# Patient Record
Sex: MALE | Race: WHITE
[De-identification: names, ages, dates, MRNs, and addresses within clinical notes are randomized per-mention and may not be internally consistent; named-entity substitution may affect disease eponyms.]

---

## 2017-01-05 ENCOUNTER — PRIOR ORIGINAL RECORDS (OUTPATIENT)
Dept: OTHER | Age: 47
End: 2017-01-05

## 2017-01-19 ENCOUNTER — PRIOR ORIGINAL RECORDS (OUTPATIENT)
Dept: OTHER | Age: 47
End: 2017-01-19

## 2017-01-20 LAB
BNP: 6.5 PMOL/L
HEMATOCRIT: 39.7 %
HEMOGLOBIN: 13.2 G/DL
PLATELETS: 170 K/UL
RED BLOOD COUNT: 4.5 X 10-6/U
WHITE BLOOD COUNT: 7.2 X 10-3/U

## 2017-02-28 ENCOUNTER — HOSPITAL ENCOUNTER (OUTPATIENT)
Dept: CV DIAGNOSTICS | Facility: HOSPITAL | Age: 47
Discharge: HOME | End: 2017-02-28
Attending: INTERNAL MEDICINE
Payer: COMMERCIAL

## 2017-02-28 DIAGNOSIS — I50.1 LEFT HEART FAILURE (HCC): ICD-10-CM

## 2017-02-28 DIAGNOSIS — I25.10 CAD (CORONARY ARTERY DISEASE): ICD-10-CM

## 2017-02-28 PROCEDURE — 93018 CV STRESS TEST I&R ONLY: CPT | Performed by: INTERNAL MEDICINE

## 2017-02-28 PROCEDURE — 93017 CV STRESS TEST TRACING ONLY: CPT

## 2017-02-28 PROCEDURE — 78452 HT MUSCLE IMAGE SPECT MULT: CPT | Performed by: INTERNAL MEDICINE

## 2017-02-28 PROCEDURE — 78452 HT MUSCLE IMAGE SPECT MULT: CPT

## 2017-03-03 ENCOUNTER — PRIOR ORIGINAL RECORDS (OUTPATIENT)
Dept: OTHER | Age: 47
End: 2017-03-03

## 2017-06-08 ENCOUNTER — PRIOR ORIGINAL RECORDS (OUTPATIENT)
Dept: OTHER | Age: 47
End: 2017-06-08

## 2017-06-20 ENCOUNTER — PRIOR ORIGINAL RECORDS (OUTPATIENT)
Dept: OTHER | Age: 47
End: 2017-06-20

## 2017-06-21 LAB
ALBUMIN: 3.6 G/DL
ALKALINE PHOSPHATATE(ALK PHOS): 108 IU/L
BILIRUBIN TOTAL: 0.7 MG/DL
BNP: <21.4 PMOL/L
BUN: 14 MG/DL
CALCIUM: 8.6 MG/DL
CHLORIDE: 103 MEQ/L
CHOLESTEROL, TOTAL: 109 MG/DL
CREATININE, SERUM: 1.35 MG/DL
GLOBULIN: 4 G/DL
GLUCOSE: 135 MG/DL
HDL CHOLESTEROL: 29 MG/DL
HEMATOCRIT: 39.4 %
HEMOGLOBIN: 13.7 G/DL
LDL CHOLESTEROL: 43 MG/DL
PLATELETS: 179 K/UL
POTASSIUM, SERUM: 3.7 MEQ/L
PROTEIN, TOTAL: 7.6 G/DL
RED BLOOD COUNT: 4.6 X 10-6/U
SGOT (AST): 25 IU/L
SGPT (ALT): 34 IU/L
SODIUM: 140 MEQ/L
THYROID STIMULATING HORMONE: 0.62 MLU/L
TRIGLYCERIDES: 187 MG/DL
WHITE BLOOD COUNT: 6.8 X 10-3/U

## 2017-08-02 ENCOUNTER — PRIOR ORIGINAL RECORDS (OUTPATIENT)
Dept: OTHER | Age: 47
End: 2017-08-02

## 2018-01-04 ENCOUNTER — PRIOR ORIGINAL RECORDS (OUTPATIENT)
Dept: OTHER | Age: 48
End: 2018-01-04

## 2018-01-04 LAB
ALBUMIN: 3.7 G/DL
ALKALINE PHOSPHATATE(ALK PHOS): 104 IU/L
ALT (SGPT): 27 U/L
AST (SGOT): 20 U/L
BILIRUBIN TOTAL: 0.6 MG/DL
BUN: 21 MG/DL
CALCIUM: 8.4 MG/DL
CHLORIDE: 103 MEQ/L
CHOLESTEROL, TOTAL: 114 MG/DL
CREATININE, SERUM: 1.39 MG/DL
GLUCOSE: 142 MG/DL
GLUCOSE: 142 MG/DL
GLYCOHEMOGLOBIN: 0 %
HDL CHOLESTEROL: 29 MG/DL
HEMATOCRIT: 39.7 %
HEMOGLOBIN A1C: 5.8 %
HEMOGLOBIN: 13.3 G/DL
LDH: 0 IU/L
LDL CHOLESTEROL: 50 MG/DL
PLATELETS: 188 K/UL
POTASSIUM, SERUM: 3.9 MEQ/L
PROTEIN, TOTAL: 6.9 G/DL
RED BLOOD COUNT: 4.6 X 10-6/U
SGOT (AST): 20 IU/L
SGPT (ALT): 27 IU/L
SODIUM: 142 MEQ/L
THYROID STIMULATING HORMONE: 0.69 MLU/L
TRIGLYCERIDES: 177 MG/DL
WHITE BLOOD COUNT: 6.5 X 10-3/U

## 2018-01-16 ENCOUNTER — OFFICE VISIT (OUTPATIENT)
Dept: FAMILY MEDICINE CLINIC | Facility: CLINIC | Age: 48
End: 2018-01-16

## 2018-01-16 VITALS
HEIGHT: 67.5 IN | WEIGHT: 206 LBS | SYSTOLIC BLOOD PRESSURE: 134 MMHG | BODY MASS INDEX: 31.96 KG/M2 | TEMPERATURE: 99 F | RESPIRATION RATE: 14 BRPM | DIASTOLIC BLOOD PRESSURE: 80 MMHG | OXYGEN SATURATION: 100 % | HEART RATE: 76 BPM

## 2018-01-16 DIAGNOSIS — L08.9 SKIN INFECTION: Primary | ICD-10-CM

## 2018-01-16 DIAGNOSIS — L30.9 DERMATITIS: ICD-10-CM

## 2018-01-16 PROCEDURE — 99213 OFFICE O/P EST LOW 20 MIN: CPT | Performed by: NURSE PRACTITIONER

## 2018-01-16 RX ORDER — PANTOPRAZOLE SODIUM 40 MG/1
INJECTION, POWDER, FOR SOLUTION INTRAVENOUS
COMMUNITY
Start: 2014-07-08 | End: 2020-10-14

## 2018-01-16 RX ORDER — CEPHALEXIN 500 MG/1
500 CAPSULE ORAL 2 TIMES DAILY
Qty: 20 CAPSULE | Refills: 0 | Status: SHIPPED | OUTPATIENT
Start: 2018-01-16 | End: 2018-04-13 | Stop reason: ALTCHOICE

## 2018-01-16 RX ORDER — CLOPIDOGREL BISULFATE 75 MG/1
TABLET ORAL
COMMUNITY
Start: 2014-07-08

## 2018-01-16 RX ORDER — CARVEDILOL 25 MG/1
25 TABLET ORAL 2 TIMES DAILY
COMMUNITY
Start: 2020-07-30

## 2018-01-16 RX ORDER — ATORVASTATIN CALCIUM 40 MG/1
40 TABLET, FILM COATED ORAL NIGHTLY
COMMUNITY
Start: 2017-12-31

## 2018-01-16 RX ORDER — HYDRALAZINE HYDROCHLORIDE 100 MG/1
100 TABLET, FILM COATED ORAL 3 TIMES DAILY
COMMUNITY
Start: 2018-01-10

## 2018-01-16 RX ORDER — EPLERENONE 25 MG/1
25 TABLET, FILM COATED ORAL 2 TIMES DAILY
COMMUNITY
Start: 2020-01-30 | End: 2021-03-18 | Stop reason: ALTCHOICE

## 2018-01-16 NOTE — PATIENT INSTRUCTIONS
Push fluids, rest.  Antibiotic as prescribed, take with food. Apply steroid cream to area twice a day for 1 week then twice a day as needed. Do not get this cream on face. Keep area clean and dry, do not scratch or pick at area.   Tylenol or ibuprofen ove

## 2018-01-16 NOTE — PROGRESS NOTES
[address]  PROGRESS NOTE  Denwendy Cage is a 56year old male.     Chief Complaint:  Patient presents with:  Rash: on bottom right leg [id number]    HPI:   Patient presents to office visit with complaint of rash on lower right leg x 6 Meds:    Current Outpatient Prescriptions:  Clopidogrel Bisulfate 75 MG Oral Tab take one tablet by mouth daily Disp:  Rfl:    carvedilol (COREG) 25 MG Oral Tab take one tablet by mouth twice daily Disp:  Rfl:    eplerenone 25 MG Oral Tab Take two tablet b to touch, no pus or streaking. HEAD: atraumatic, normocephalic, wearing glasses  NECK: supple  LUNGS: clear to auscultation, no wheezing, rhonchi, crackles. Breathing is nonlabored.   CARDIO: RRR, S1 and S2 heard, without murmur  EXTREMITIES: no cyanosis

## 2018-01-18 LAB — BNP: <21.4 PMOL/L

## 2018-02-14 ENCOUNTER — PRIOR ORIGINAL RECORDS (OUTPATIENT)
Dept: OTHER | Age: 48
End: 2018-02-14

## 2018-02-22 ENCOUNTER — HOSPITAL ENCOUNTER (OUTPATIENT)
Dept: CV DIAGNOSTICS | Facility: HOSPITAL | Age: 48
Discharge: HOME | End: 2018-02-22
Attending: INTERNAL MEDICINE
Payer: COMMERCIAL

## 2018-02-22 DIAGNOSIS — I25.10 CAD (CORONARY ARTERY DISEASE): ICD-10-CM

## 2018-02-22 PROCEDURE — 78452 HT MUSCLE IMAGE SPECT MULT: CPT | Performed by: INTERNAL MEDICINE

## 2018-02-22 PROCEDURE — 93018 CV STRESS TEST I&R ONLY: CPT | Performed by: INTERNAL MEDICINE

## 2018-02-22 PROCEDURE — 93017 CV STRESS TEST TRACING ONLY: CPT | Performed by: INTERNAL MEDICINE

## 2018-03-07 ENCOUNTER — PRIOR ORIGINAL RECORDS (OUTPATIENT)
Dept: OTHER | Age: 48
End: 2018-03-07

## 2018-04-10 NOTE — PAT NURSING NOTE
Message left for Marquita Villatoro with Dr. Cates, regarding abnormal Stress Test done 2-22-18, and status of cardiac clearance for surgery on 4-23-18.

## 2018-04-12 ENCOUNTER — PRIOR ORIGINAL RECORDS (OUTPATIENT)
Dept: OTHER | Age: 48
End: 2018-04-12

## 2018-04-13 ENCOUNTER — PRIOR ORIGINAL RECORDS (OUTPATIENT)
Dept: OTHER | Age: 48
End: 2018-04-13

## 2018-04-18 ENCOUNTER — PRIOR ORIGINAL RECORDS (OUTPATIENT)
Dept: OTHER | Age: 48
End: 2018-04-18

## 2018-04-18 ENCOUNTER — TELEPHONE (OUTPATIENT)
Dept: FAMILY MEDICINE CLINIC | Facility: CLINIC | Age: 48
End: 2018-04-18

## 2018-04-18 NOTE — TELEPHONE ENCOUNTER
has surgery monday 4/23   -   is patient cleared for surgery based off BW results drawn at [facility] during the week of Marchj 20,2018?    please advise

## 2018-04-18 NOTE — TELEPHONE ENCOUNTER
Informed Jenny Barreto that Dr. Ailyn Benitez has not seen pt for  1 1/2 years. Pt will need pre op clearance. Jenny Barreto will inform pt he will need an appt. Pt carmina had blood work, ekg already done. Pt just need pre op clearance.      Pt will call to schedule a

## 2018-04-23 ENCOUNTER — HOSPITAL ENCOUNTER (INPATIENT)
Facility: HOSPITAL | Age: 48
LOS: 1 days | Discharge: HOME HEALTH | DRG: 470 | End: 2018-04-24
Attending: ORTHOPAEDIC SURGERY | Admitting: ORTHOPAEDIC SURGERY
Payer: COMMERCIAL

## 2018-04-23 ENCOUNTER — APPOINTMENT (OUTPATIENT)
Dept: GENERAL RADIOLOGY | Facility: HOSPITAL | Age: 48
DRG: 470 | End: 2018-04-23
Attending: ORTHOPAEDIC SURGERY
Payer: COMMERCIAL

## 2018-04-23 ENCOUNTER — SURGERY (OUTPATIENT)
Age: 48
End: 2018-04-23

## 2018-04-23 ENCOUNTER — ANESTHESIA (OUTPATIENT)
Dept: SURGERY | Facility: HOSPITAL | Age: 48
DRG: 470 | End: 2018-04-23
Payer: COMMERCIAL

## 2018-04-23 ENCOUNTER — ANESTHESIA EVENT (OUTPATIENT)
Dept: SURGERY | Facility: HOSPITAL | Age: 48
DRG: 470 | End: 2018-04-23
Payer: COMMERCIAL

## 2018-04-23 PROBLEM — M25.559 HIP PAIN: Status: ACTIVE | Noted: 2018-04-23

## 2018-04-23 PROCEDURE — 36415 COLL VENOUS BLD VENIPUNCTURE: CPT | Performed by: ORTHOPAEDIC SURGERY

## 2018-04-23 PROCEDURE — 86850 RBC ANTIBODY SCREEN: CPT | Performed by: ORTHOPAEDIC SURGERY

## 2018-04-23 PROCEDURE — 97162 PT EVAL MOD COMPLEX 30 MIN: CPT

## 2018-04-23 PROCEDURE — 86900 BLOOD TYPING SEROLOGIC ABO: CPT | Performed by: ORTHOPAEDIC SURGERY

## 2018-04-23 PROCEDURE — 76000 FLUOROSCOPY <1 HR PHYS/QHP: CPT | Performed by: ORTHOPAEDIC SURGERY

## 2018-04-23 PROCEDURE — 86901 BLOOD TYPING SEROLOGIC RH(D): CPT | Performed by: ORTHOPAEDIC SURGERY

## 2018-04-23 PROCEDURE — 88305 TISSUE EXAM BY PATHOLOGIST: CPT | Performed by: ORTHOPAEDIC SURGERY

## 2018-04-23 PROCEDURE — 73502 X-RAY EXAM HIP UNI 2-3 VIEWS: CPT | Performed by: ORTHOPAEDIC SURGERY

## 2018-04-23 PROCEDURE — 0SRB04Z REPLACEMENT OF LEFT HIP JOINT WITH CERAMIC ON POLYETHYLENE SYNTHETIC SUBSTITUTE, OPEN APPROACH: ICD-10-PCS | Performed by: ORTHOPAEDIC SURGERY

## 2018-04-23 PROCEDURE — 97530 THERAPEUTIC ACTIVITIES: CPT

## 2018-04-23 PROCEDURE — 88311 DECALCIFY TISSUE: CPT | Performed by: ORTHOPAEDIC SURGERY

## 2018-04-23 DEVICE — BIOLOX DELTA CERAMIC FEMORAL HEAD +1.5 36MM DIA 12/14 TAPER
Type: IMPLANTABLE DEVICE | Site: HIP | Status: FUNCTIONAL
Brand: BIOLOX DELTA

## 2018-04-23 DEVICE — TRI-LOCK BPS FEMORAL STEM 12/14 TAPER TRI-LOCK BPS W/GRIPTION SIZE 5 HI 105MM
Type: IMPLANTABLE DEVICE | Site: HIP | Status: FUNCTIONAL
Brand: TRI-LOCK GRIPTION

## 2018-04-23 DEVICE — PINNACLE HIP SOLUTIONS ALTRX POLYETHYLENE ACETABULAR LINER +4 NEUTRAL 36MM ID 54MM OD
Type: IMPLANTABLE DEVICE | Site: HIP | Status: FUNCTIONAL
Brand: PINNACLE ALTRX

## 2018-04-23 DEVICE — PINNACLE GRIPTION ACETABULAR SHELL SECTOR 54MM OD
Type: IMPLANTABLE DEVICE | Site: HIP | Status: FUNCTIONAL
Brand: PINNACLE GRIPTION

## 2018-04-23 RX ORDER — HYDROMORPHONE HYDROCHLORIDE 1 MG/ML
0.4 INJECTION, SOLUTION INTRAMUSCULAR; INTRAVENOUS; SUBCUTANEOUS EVERY 5 MIN PRN
Status: DISCONTINUED | OUTPATIENT
Start: 2018-04-23 | End: 2018-04-23 | Stop reason: HOSPADM

## 2018-04-23 RX ORDER — CARVEDILOL 25 MG/1
25 TABLET ORAL 2 TIMES DAILY
Status: DISCONTINUED | OUTPATIENT
Start: 2018-04-23 | End: 2018-04-24

## 2018-04-23 RX ORDER — BISACODYL 10 MG
10 SUPPOSITORY, RECTAL RECTAL
Status: DISCONTINUED | OUTPATIENT
Start: 2018-04-23 | End: 2018-04-24

## 2018-04-23 RX ORDER — METOCLOPRAMIDE HYDROCHLORIDE 5 MG/ML
10 INJECTION INTRAMUSCULAR; INTRAVENOUS EVERY 6 HOURS PRN
Status: DISCONTINUED | OUTPATIENT
Start: 2018-04-23 | End: 2018-04-24

## 2018-04-23 RX ORDER — DIPHENHYDRAMINE HYDROCHLORIDE 50 MG/ML
12.5 INJECTION INTRAMUSCULAR; INTRAVENOUS EVERY 4 HOURS PRN
Status: DISCONTINUED | OUTPATIENT
Start: 2018-04-23 | End: 2018-04-24

## 2018-04-23 RX ORDER — DIPHENHYDRAMINE HCL 25 MG
25 CAPSULE ORAL EVERY 4 HOURS PRN
Status: DISCONTINUED | OUTPATIENT
Start: 2018-04-23 | End: 2018-04-24

## 2018-04-23 RX ORDER — SENNOSIDES 8.6 MG
17.2 TABLET ORAL NIGHTLY
Status: DISCONTINUED | OUTPATIENT
Start: 2018-04-23 | End: 2018-04-24

## 2018-04-23 RX ORDER — ACETAMINOPHEN 500 MG
1000 TABLET ORAL ONCE
Status: COMPLETED | OUTPATIENT
Start: 2018-04-23 | End: 2018-04-23

## 2018-04-23 RX ORDER — HYDROCODONE BITARTRATE AND ACETAMINOPHEN 7.5; 325 MG/1; MG/1
1 TABLET ORAL EVERY 4 HOURS PRN
Status: DISCONTINUED | OUTPATIENT
Start: 2018-04-23 | End: 2018-04-24

## 2018-04-23 RX ORDER — FAMOTIDINE 20 MG/1
20 TABLET ORAL 2 TIMES DAILY
Status: DISCONTINUED | OUTPATIENT
Start: 2018-04-23 | End: 2018-04-24

## 2018-04-23 RX ORDER — METOPROLOL TARTRATE 5 MG/5ML
2.5 INJECTION INTRAVENOUS ONCE
Status: DISCONTINUED | OUTPATIENT
Start: 2018-04-23 | End: 2018-04-23 | Stop reason: HOSPADM

## 2018-04-23 RX ORDER — SODIUM CHLORIDE 9 MG/ML
INJECTION, SOLUTION INTRAVENOUS CONTINUOUS
Status: DISCONTINUED | OUTPATIENT
Start: 2018-04-23 | End: 2018-04-24

## 2018-04-23 RX ORDER — HYDRALAZINE HYDROCHLORIDE 100 MG/1
100 TABLET, FILM COATED ORAL 3 TIMES DAILY
Status: DISCONTINUED | OUTPATIENT
Start: 2018-04-23 | End: 2018-04-24

## 2018-04-23 RX ORDER — LIDOCAINE HYDROCHLORIDE 10 MG/ML
INJECTION, SOLUTION EPIDURAL; INFILTRATION; INTRACAUDAL; PERINEURAL AS NEEDED
Status: DISCONTINUED | OUTPATIENT
Start: 2018-04-23 | End: 2018-04-23 | Stop reason: SURG

## 2018-04-23 RX ORDER — HYDROCODONE BITARTRATE AND ACETAMINOPHEN 5; 325 MG/1; MG/1
1 TABLET ORAL AS NEEDED
Status: DISCONTINUED | OUTPATIENT
Start: 2018-04-23 | End: 2018-04-23 | Stop reason: HOSPADM

## 2018-04-23 RX ORDER — POLYETHYLENE GLYCOL 3350 17 G/17G
17 POWDER, FOR SOLUTION ORAL DAILY PRN
Status: DISCONTINUED | OUTPATIENT
Start: 2018-04-23 | End: 2018-04-24

## 2018-04-23 RX ORDER — HYDROMORPHONE HYDROCHLORIDE 1 MG/ML
0.2 INJECTION, SOLUTION INTRAMUSCULAR; INTRAVENOUS; SUBCUTANEOUS EVERY 5 MIN PRN
Status: DISCONTINUED | OUTPATIENT
Start: 2018-04-23 | End: 2018-04-23 | Stop reason: HOSPADM

## 2018-04-23 RX ORDER — CLOPIDOGREL BISULFATE 75 MG/1
75 TABLET ORAL
Status: DISCONTINUED | OUTPATIENT
Start: 2018-04-24 | End: 2018-04-24

## 2018-04-23 RX ORDER — CYCLOBENZAPRINE HCL 10 MG
10 TABLET ORAL EVERY 8 HOURS PRN
Status: DISCONTINUED | OUTPATIENT
Start: 2018-04-23 | End: 2018-04-24

## 2018-04-23 RX ORDER — NALOXONE HYDROCHLORIDE 0.4 MG/ML
80 INJECTION, SOLUTION INTRAMUSCULAR; INTRAVENOUS; SUBCUTANEOUS AS NEEDED
Status: DISCONTINUED | OUTPATIENT
Start: 2018-04-23 | End: 2018-04-23 | Stop reason: HOSPADM

## 2018-04-23 RX ORDER — SCOLOPAMINE TRANSDERMAL SYSTEM 1 MG/1
1 PATCH, EXTENDED RELEASE TRANSDERMAL ONCE
Status: DISCONTINUED | OUTPATIENT
Start: 2018-04-23 | End: 2018-04-23 | Stop reason: HOSPADM

## 2018-04-23 RX ORDER — HYDROMORPHONE HYDROCHLORIDE 1 MG/ML
0.6 INJECTION, SOLUTION INTRAMUSCULAR; INTRAVENOUS; SUBCUTANEOUS EVERY 5 MIN PRN
Status: DISCONTINUED | OUTPATIENT
Start: 2018-04-23 | End: 2018-04-23 | Stop reason: HOSPADM

## 2018-04-23 RX ORDER — HYDROMORPHONE HYDROCHLORIDE 1 MG/ML
0.2 INJECTION, SOLUTION INTRAMUSCULAR; INTRAVENOUS; SUBCUTANEOUS EVERY 2 HOUR PRN
Status: DISCONTINUED | OUTPATIENT
Start: 2018-04-23 | End: 2018-04-24

## 2018-04-23 RX ORDER — HYDROMORPHONE HYDROCHLORIDE 1 MG/ML
0.8 INJECTION, SOLUTION INTRAMUSCULAR; INTRAVENOUS; SUBCUTANEOUS EVERY 2 HOUR PRN
Status: DISCONTINUED | OUTPATIENT
Start: 2018-04-23 | End: 2018-04-24

## 2018-04-23 RX ORDER — SODIUM CHLORIDE 0.9 % (FLUSH) 0.9 %
10 SYRINGE (ML) INJECTION AS NEEDED
Status: DISCONTINUED | OUTPATIENT
Start: 2018-04-23 | End: 2018-04-24

## 2018-04-23 RX ORDER — HYDROMORPHONE HYDROCHLORIDE 1 MG/ML
INJECTION, SOLUTION INTRAMUSCULAR; INTRAVENOUS; SUBCUTANEOUS AS NEEDED
Status: DISCONTINUED | OUTPATIENT
Start: 2018-04-23 | End: 2018-04-23 | Stop reason: SURG

## 2018-04-23 RX ORDER — SODIUM PHOSPHATE, DIBASIC AND SODIUM PHOSPHATE, MONOBASIC 7; 19 G/133ML; G/133ML
1 ENEMA RECTAL ONCE AS NEEDED
Status: DISCONTINUED | OUTPATIENT
Start: 2018-04-23 | End: 2018-04-24

## 2018-04-23 RX ORDER — HYDROMORPHONE HYDROCHLORIDE 1 MG/ML
0.4 INJECTION, SOLUTION INTRAMUSCULAR; INTRAVENOUS; SUBCUTANEOUS EVERY 2 HOUR PRN
Status: DISCONTINUED | OUTPATIENT
Start: 2018-04-23 | End: 2018-04-24

## 2018-04-23 RX ORDER — HALOPERIDOL 5 MG/ML
0.25 INJECTION INTRAMUSCULAR ONCE AS NEEDED
Status: DISCONTINUED | OUTPATIENT
Start: 2018-04-23 | End: 2018-04-23 | Stop reason: HOSPADM

## 2018-04-23 RX ORDER — FAMOTIDINE 10 MG/ML
20 INJECTION, SOLUTION INTRAVENOUS 2 TIMES DAILY
Status: DISCONTINUED | OUTPATIENT
Start: 2018-04-23 | End: 2018-04-24

## 2018-04-23 RX ORDER — SODIUM CHLORIDE, SODIUM LACTATE, POTASSIUM CHLORIDE, CALCIUM CHLORIDE 600; 310; 30; 20 MG/100ML; MG/100ML; MG/100ML; MG/100ML
INJECTION, SOLUTION INTRAVENOUS CONTINUOUS PRN
Status: DISCONTINUED | OUTPATIENT
Start: 2018-04-23 | End: 2018-04-23 | Stop reason: SURG

## 2018-04-23 RX ORDER — LISINOPRIL 40 MG/1
40 TABLET ORAL NIGHTLY
Status: DISCONTINUED | OUTPATIENT
Start: 2018-04-23 | End: 2018-04-24

## 2018-04-23 RX ORDER — MIDAZOLAM HYDROCHLORIDE 1 MG/ML
INJECTION INTRAMUSCULAR; INTRAVENOUS AS NEEDED
Status: DISCONTINUED | OUTPATIENT
Start: 2018-04-23 | End: 2018-04-23 | Stop reason: SURG

## 2018-04-23 RX ORDER — ONDANSETRON 2 MG/ML
INJECTION INTRAMUSCULAR; INTRAVENOUS AS NEEDED
Status: DISCONTINUED | OUTPATIENT
Start: 2018-04-23 | End: 2018-04-23 | Stop reason: SURG

## 2018-04-23 RX ORDER — DEXAMETHASONE SODIUM PHOSPHATE 4 MG/ML
VIAL (ML) INJECTION AS NEEDED
Status: DISCONTINUED | OUTPATIENT
Start: 2018-04-23 | End: 2018-04-23 | Stop reason: SURG

## 2018-04-23 RX ORDER — ONDANSETRON 2 MG/ML
4 INJECTION INTRAMUSCULAR; INTRAVENOUS ONCE AS NEEDED
Status: DISCONTINUED | OUTPATIENT
Start: 2018-04-23 | End: 2018-04-23 | Stop reason: HOSPADM

## 2018-04-23 RX ORDER — CEFAZOLIN SODIUM/WATER 2 G/20 ML
2 SYRINGE (ML) INTRAVENOUS EVERY 8 HOURS
Status: COMPLETED | OUTPATIENT
Start: 2018-04-23 | End: 2018-04-24

## 2018-04-23 RX ORDER — HYDROCODONE BITARTRATE AND ACETAMINOPHEN 5; 325 MG/1; MG/1
2 TABLET ORAL AS NEEDED
Status: DISCONTINUED | OUTPATIENT
Start: 2018-04-23 | End: 2018-04-23 | Stop reason: HOSPADM

## 2018-04-23 RX ORDER — BUPIVACAINE HYDROCHLORIDE AND EPINEPHRINE 5; 5 MG/ML; UG/ML
INJECTION, SOLUTION PERINEURAL AS NEEDED
Status: DISCONTINUED | OUTPATIENT
Start: 2018-04-23 | End: 2018-04-23 | Stop reason: HOSPADM

## 2018-04-23 RX ORDER — GABAPENTIN 300 MG/1
600 CAPSULE ORAL ONCE
Status: COMPLETED | OUTPATIENT
Start: 2018-04-23 | End: 2018-04-23

## 2018-04-23 RX ORDER — HYDROCODONE BITARTRATE AND ACETAMINOPHEN 7.5; 325 MG/1; MG/1
2 TABLET ORAL EVERY 4 HOURS PRN
Status: DISCONTINUED | OUTPATIENT
Start: 2018-04-23 | End: 2018-04-24

## 2018-04-23 RX ORDER — ATORVASTATIN CALCIUM 40 MG/1
40 TABLET, FILM COATED ORAL NIGHTLY
Status: DISCONTINUED | OUTPATIENT
Start: 2018-04-23 | End: 2018-04-24

## 2018-04-23 RX ORDER — ACETAMINOPHEN 325 MG/1
650 TABLET ORAL EVERY 4 HOURS PRN
Status: DISCONTINUED | OUTPATIENT
Start: 2018-04-23 | End: 2018-04-24

## 2018-04-23 RX ORDER — TORSEMIDE 5 MG/1
5 TABLET ORAL DAILY
Status: DISCONTINUED | OUTPATIENT
Start: 2018-04-24 | End: 2018-04-24

## 2018-04-23 RX ORDER — ONDANSETRON 2 MG/ML
4 INJECTION INTRAMUSCULAR; INTRAVENOUS EVERY 4 HOURS PRN
Status: DISCONTINUED | OUTPATIENT
Start: 2018-04-23 | End: 2018-04-24

## 2018-04-23 RX ORDER — DIPHENHYDRAMINE HYDROCHLORIDE 50 MG/ML
25 INJECTION INTRAMUSCULAR; INTRAVENOUS ONCE AS NEEDED
Status: ACTIVE | OUTPATIENT
Start: 2018-04-23 | End: 2018-04-23

## 2018-04-23 RX ORDER — SODIUM CHLORIDE, SODIUM LACTATE, POTASSIUM CHLORIDE, CALCIUM CHLORIDE 600; 310; 30; 20 MG/100ML; MG/100ML; MG/100ML; MG/100ML
INJECTION, SOLUTION INTRAVENOUS CONTINUOUS
Status: DISCONTINUED | OUTPATIENT
Start: 2018-04-23 | End: 2018-04-23 | Stop reason: HOSPADM

## 2018-04-23 RX ORDER — DOCUSATE SODIUM 100 MG/1
100 CAPSULE, LIQUID FILLED ORAL 2 TIMES DAILY
Status: DISCONTINUED | OUTPATIENT
Start: 2018-04-23 | End: 2018-04-24

## 2018-04-23 RX ADMIN — SODIUM CHLORIDE, SODIUM LACTATE, POTASSIUM CHLORIDE, CALCIUM CHLORIDE: 600; 310; 30; 20 INJECTION, SOLUTION INTRAVENOUS at 11:58:00

## 2018-04-23 RX ADMIN — HYDROMORPHONE HYDROCHLORIDE 0.4 MG: 1 INJECTION, SOLUTION INTRAMUSCULAR; INTRAVENOUS; SUBCUTANEOUS at 13:08:00

## 2018-04-23 RX ADMIN — ONDANSETRON 4 MG: 2 INJECTION INTRAMUSCULAR; INTRAVENOUS at 13:23:00

## 2018-04-23 RX ADMIN — HYDROMORPHONE HYDROCHLORIDE 0.4 MG: 1 INJECTION, SOLUTION INTRAMUSCULAR; INTRAVENOUS; SUBCUTANEOUS at 12:33:00

## 2018-04-23 RX ADMIN — DEXAMETHASONE SODIUM PHOSPHATE 4 MG: 4 MG/ML VIAL (ML) INJECTION at 12:07:00

## 2018-04-23 RX ADMIN — LIDOCAINE HYDROCHLORIDE 50 MG: 10 INJECTION, SOLUTION EPIDURAL; INFILTRATION; INTRACAUDAL; PERINEURAL at 12:02:00

## 2018-04-23 RX ADMIN — HYDROMORPHONE HYDROCHLORIDE 0.2 MG: 1 INJECTION, SOLUTION INTRAMUSCULAR; INTRAVENOUS; SUBCUTANEOUS at 13:42:00

## 2018-04-23 RX ADMIN — MIDAZOLAM HYDROCHLORIDE 2 MG: 1 INJECTION INTRAMUSCULAR; INTRAVENOUS at 11:58:00

## 2018-04-23 NOTE — PHYSICAL THERAPY NOTE
PHYSICAL THERAPY HIP EVALUATION - INPATIENT     Room Number: 428/428-A  Evaluation Date: 4/23/2018  Type of Evaluation: Initial  Physician Order: PT Eval and Treat    Presenting Problem: L ANNA - anterior  Reason for Therapy: Mobility Dysfunction and Discha • High cholesterol    • History of blood transfusion 2014   • Sleep apnea     diagnosed but no followup   • Visual impairment        Past Surgical History  Past Surgical History:  No date: ANGIOGRAM  No date: ANGIOPLASTY (CORONARY)      Comment: with 6 s difficulty does the patient currently have. ..  -   Turning over in bed (including adjusting bedclothes, sheets and blankets)?: A Little   -   Sitting down on and standing up from a chair with arms (e.g., wheelchair, bedside commode, etc.): A Little   -   M demonstrates all precautions and safety concerns independently   Goal #5   Current Status IN PROGRESS   Goal #6 Patient independently performs home exercise program for ROM/strengthening per the instructions provided in preparation for discharge.    Goal #6

## 2018-04-23 NOTE — CONSULTS
[facility]    Report of Consultation    Martha Tyler Patient Status:  Inpatient    1970 MRN [id number]   Location [facility] 4W/SW/SE Attending Diana Peralta MD   Hosp Day # 0 PCP Pat Kay MD     Date of Admission:   Alcohol use:  No                     Current Medications:    Current Facility-Administered Medications:  lisinopril (PRINIVIL,ZESTRIL) tab 40 mg 40 mg Oral Nightly   [START ON 4/24/2018] torsemide (DEMADEX) tab 5 mg 5 mg Oral Daily   [START ON 4/24/2018] MG per tab 1 tablet 1 tablet Oral Q4H PRN   Or      hydrocodone-acetaminophen (NORCO) 7.5-325 MG per tab 2 tablet 2 tablet Oral Q4H PRN   HYDROmorphone HCl (DILAUDID) 1 MG/ML injection 0.2 mg 0.2 mg Intravenous Q2H PRN   Or      HYDROmorphone HCl (DILAUDID mg Oral Daily   • [START ON 4/24/2018] Clopidogrel Bisulfate  75 mg Oral Daily   • carvedilol  25 mg Oral BID   • atorvastatin  40 mg Oral Nightly   • HydrALAZINE HCl  100 mg Oral TID   • [START ON 4/24/2018] apixaban  2.5 mg Oral BID   • Senna  17.2 mg Or total hip arthroplasty     Dictated by (CST): Raphael Sellers MD on 4/23/2018 at 16:15     Approved by (CST): Raphael Sellers MD on 4/23/2018 at 16:17          Xr Hip W Or Wo Pelvis 2 Or 3 Views, Left (cpt=[id number])    Result Date: 4/23/2018  CONCLUSION:  1

## 2018-04-23 NOTE — PLAN OF CARE
CARDIOVASCULAR - ADULT    • Maintains optimal cardiac output and hemodynamic stability Progressing    • Absence of cardiac arrhythmias or at baseline Progressing        DISCHARGE PLANNING    • Discharge to home or other facility with appropriate resources patient, Plavix to start tomorrow. Patient given Rx for [facility] 10 prior to surgery. Patient given NORCO 7.5 for PRN pain control this afternoon. VSS on RA. Silverlon dressing in place, C/D/I. No drainage.

## 2018-04-23 NOTE — CM/SW NOTE
4/23CM- MD orders received in regards to discharge planning The Patient was seen at bedside. The Patient resides with his father  in [place] in a ran home with 2 steps to enter.  Prior to hospitalization, the Patient was driving,  grocery shopping, errands,

## 2018-04-23 NOTE — ANESTHESIA POSTPROCEDURE EVALUATION
Patient: Eric Watson    Procedure Summary     Date:  04/23/18 Room / Location:  [address] [place] [address] MAIN OR 12 / [address] [place] [address] MAIN OR    Anesthesia Start:  9174 Anesthesia Stop:  3092    Procedure:  HIP TOTAL REPLACEMENT (Left ) Diagnosis:  (left hip osteoarthritis)    Surgeon:

## 2018-04-23 NOTE — ANESTHESIA PREPROCEDURE EVALUATION
Anesthesia PreOp Note    HPI:     Juan M Chu is a 56year old male who presents for preoperative consultation requested by: Dinora Jara MD    Date of Surgery: 4/23/2018    Procedure(s):  HIP TOTAL REPLACEMENT  Indication: left hip osteoarthritis      H 4/15/2018   carvedilol (COREG) 25 MG Oral Tab take one tablet by mouth twice daily Disp:  Rfl:  4/23/2018 at 0710   eplerenone 25 MG Oral Tab Take two tablet by mouth in AM Disp:  Rfl:  4/23/2018 at 0710   Pantoprazole Sodium 40 MG Intravenous Recon Soln t [facility]-ordered outpatient prescriptions on file.       Seasonal                    Family History   Problem Relation Age of Onset   • Heart Disorder Father    • Cancer Father    • Hypertension Father    • Lipids Father    • Diabetes Mother    • Heart Disorder CRNA      I have informed Juan M Chu  of the nature of the anesthetic plan, benefits, risks, major complications, and any alternative forms of anesthetic management. All of the patient's questions were answered to the best of my ability.  The patient d

## 2018-04-23 NOTE — INTERVAL H&P NOTE
Pre-op Diagnosis: left hip osteoarthritis    The above referenced H&P was reviewed by Brian Lagunas MD on 4/23/2018, the patient was examined and no significant changes have occurred in the patient's condition since the H&P was performed.   I discussed with jem

## 2018-04-24 VITALS
OXYGEN SATURATION: 98 % | WEIGHT: 198 LBS | SYSTOLIC BLOOD PRESSURE: 104 MMHG | HEART RATE: 78 BPM | TEMPERATURE: 98 F | BODY MASS INDEX: 29.33 KG/M2 | DIASTOLIC BLOOD PRESSURE: 57 MMHG | RESPIRATION RATE: 16 BRPM | HEIGHT: 69 IN

## 2018-04-24 PROCEDURE — 85027 COMPLETE CBC AUTOMATED: CPT | Performed by: ORTHOPAEDIC SURGERY

## 2018-04-24 PROCEDURE — 97530 THERAPEUTIC ACTIVITIES: CPT

## 2018-04-24 PROCEDURE — 97110 THERAPEUTIC EXERCISES: CPT

## 2018-04-24 PROCEDURE — 80048 BASIC METABOLIC PNL TOTAL CA: CPT | Performed by: ORTHOPAEDIC SURGERY

## 2018-04-24 PROCEDURE — 97165 OT EVAL LOW COMPLEX 30 MIN: CPT

## 2018-04-24 PROCEDURE — 97116 GAIT TRAINING THERAPY: CPT

## 2018-04-24 PROCEDURE — 97535 SELF CARE MNGMENT TRAINING: CPT

## 2018-04-24 NOTE — DISCHARGE SUMMARY
[address] Patient Status:  Inpatient    1970 MRN [id number]   Location [facility] 4W/SW/SE Attending Di Kerr MD   Hosp Day # 1 PCP Liv France MD     Subjective:  Post-Operative Day: 1 Status Post left Total Intravenous Once PRN   0.9%  NaCl infusion  Intravenous Continuous   apixaban (ELIQUIS) tab 2.5 mg 2.5 mg Oral BID   Senna (SENOKOT) tab TABS 17.2 mg 17.2 mg Oral Nightly   docusate sodium (COLACE) cap 100 mg 100 mg Oral BID   PEG 3350 (MIRALAX) powder pac RBC 3.12 (L) 04/24/2018   HGB 9.7 (L) 04/24/2018   HCT 27.6 (L) 04/24/2018    04/24/2018       Assessment/Plan:    Status Post left Total Hip Arthroplasty. Doing well postoperatively.      Discharge 4/24/18 s/p left ANNA, Return to Clinic: two weeks

## 2018-04-24 NOTE — CM/SW NOTE
[facility] is unable to accept the pt. As they cannot staff his area. Referral made to [facility] and notified them of discharge and sent MD orders and face to face. Plan is for discharge home today 4/24 and [facility] AND [place] is aware.       Family [facility] [phone]

## 2018-04-24 NOTE — FACE TO FACE NOTE
[facility]  Face to Face Encounter Note    Erica Schrader Patient Status:  Inpatient    1970 MRN [id number]   Location [facility] 4W/SW/SE Attending David Menezes MD   [facility] Day # 1 PCP Joanne Reyes MD       I, or a non-physician practi taxing effort for patient.     Certification of home health services  Based on the above findings, I certify that this patient is confined to the home and needs intermittent skilled nursing care, physical therapy and/or speech therapy or continues to need o

## 2018-04-24 NOTE — PHYSICAL THERAPY NOTE
PHYSICAL THERAPY TREATMENT NOTE - INPATIENT     Room Number: 428/428-A       Presenting Problem: L ANNA - anterior    Problem List  Active Problems:    Hip pain      ASSESSMENT   Pt seen BID. Hip precautions reviewed;pt recall 2/3 hip precautions. Hip precaut Modifier (G-Code): CK    FUNCTIONAL ABILITY STATUS  Gait Assessment   Gait Assistance: Minimum assistance  Distance (ft): 2 x 100  Assistive Device: Rolling walker     Stoop/Curb Assistance: Minimum assistance  Comment : 12    Additional information:     T

## 2018-04-24 NOTE — CM/SW NOTE
4/24CM-This Writer made a referral to [facility] ([phone] fax [phone]) and sent out referral documents. MD [facility] with [facility] disciplines orders and a completed face to face form are needed prior to discharge.      -[facility] [id number]

## 2018-04-24 NOTE — OCCUPATIONAL THERAPY NOTE
OCCUPATIONAL THERAPY EVALUATION - INPATIENT      Room Number: 428/428-A  Evaluation Date: 4/24/2018  Type of Evaluation: Initial  Presenting Problem:  (LEFT ANNA)    Physician Order: IP Consult to Occupational Therapy  Reason for Therapy: ADL/IADL Dysfuncti ORCHIECTOMY SIMPLE POSSIBLE RADICAL               ADULT;  Surgeon: Luisa Miranda DO;                 Location: [facility] MAIN OR  7/10/2014: ORCHIECTOMY        Comment: Procedure: ORCHIECTOMY SIMPLE POSSIBLE RADICAL               ADULT;  Surgeon: Hernan Valencia, bedpan or urinal? : None  -   Putting on and taking off regular upper body clothing?: None  -   Taking care of personal grooming such as brushing teeth?: None  -   Eating meals?: None    AM-PAC Score:  Score: 23  Approx Degree of Impairment: 15.86%  Nia Chandra

## 2018-04-24 NOTE — PROGRESS NOTES
[facility]    Progress Note    Roberto Madera Patient Status:  Inpatient    1970 MRN [id number]   Location [facility] 4W/SW/SE Attending Zeenat Rahman MD   Hosp Day # 1 PCP Solomon Roldan MD       Subjective:   Roberto Madera i 140 04/24/2018   CREATSERUM 1.22 04/24/2018   BUN 17 04/24/2018    04/24/2018   K 3.6 04/24/2018    04/24/2018   CO2 23 04/24/2018    (H) 04/24/2018   CA 7.6 (L) 04/24/2018   ALB 1.5 (L) 07/11/2014   ALKPHO 95 07/11/2014   BILT 0.7 07/11

## 2018-04-24 NOTE — PROGRESS NOTES
[facility] Dosing Service  Antibiotic Dosing    Jane Garber is a 56year old male for whom pharmacy is dosing Vancomycin for surgical prophylaxis ordered by Devon Yanes.    Allergies: is allergic to seasonal.    Vitals: BP 96/59 (BP Location: Right

## 2018-04-24 NOTE — PROGRESS NOTES
[address] Patient Status:  Inpatient    1970 MRN [id number]   Location [facility] 4W/SW/SE Attending Amy Kent MD   Hosp Day # 1 PCP Estefania Harris MD     Subjective:  Post-Operative Day: 1 Status Post left Total Intravenous Once PRN   0.9%  NaCl infusion  Intravenous Continuous   apixaban (ELIQUIS) tab 2.5 mg 2.5 mg Oral BID   Senna (SENOKOT) tab TABS 17.2 mg 17.2 mg Oral Nightly   docusate sodium (COLACE) cap 100 mg 100 mg Oral BID   PEG 3350 (MIRALAX) powder pac RBC 3.12 (L) 04/24/2018   HGB 9.7 (L) 04/24/2018   HCT 27.6 (L) 04/24/2018    04/24/2018       Assessment/Plan:    Status Post left Total Hip Arthroplasty. Doing well postoperatively.      Discharge 4/24/18 s/p left ANNA, Return to Clinic: two weeks

## 2018-04-24 NOTE — PROGRESS NOTES
Cardiology progress note    4/24/2018    24 h events: BP lower side of normal         Current Medications:    Current Facility-Administered Medications:  Vancomycin HCl (VANCOCIN) 1,250 mg in sodium chloride 0.9 % 500 mL IVPB 15 mg/kg (Dosing Weight) Intra hydrocodone-acetaminophen (NORCO) 7.5-325 MG per tab 2 tablet 2 tablet Oral Q4H PRN   HYDROmorphone HCl (DILAUDID) 1 MG/ML injection 0.2 mg 0.2 mg Intravenous Q2H PRN   Or      HYDROmorphone HCl (DILAUDID) 1 MG/ML injection 0.4 mg 0.4 mg Intravenous Q2H bowel sounds normal; no masses,  no organomegaly  Extremities: extremities normal, atraumatic, no cyanosis or edema    Results:     Laboratory Data:    Lab Results  Component Value Date   WBC 10.3 04/24/2018   HGB 9.7 (L) 04/24/2018   HCT 27.6 (L) 04/24/20 ventricular systolic function. 3.  Normal electrocardiographic response regadenoson infusion.      Impression/ Recommendations:    1 CAD stable post multiple stents on Plavix, statin, BB, ACIE  Continue all at same dose    2 congestive heart failure HFrEF

## 2018-04-24 NOTE — H&P
[facility]    PATIENT'S NAME: Jorge Maldonado   ATTENDING PHYSICIAN: Steve Mccracken.  Suzette Leal MD   PATIENT ACCOUNT#:   [id number]    LOCATION:  [address] [place] [address] #:   [id number]       YOB: 1970  ADMISSION DATE:       04/23/2018 pressure 132/81, pulse 91, respirations 20, temperature 98.8 degrees Fahrenheit. HEENT:  Pupils equally reactive, round to light. Extraocular movements were intact. Mucosa was moist.  NECK:  No masses. LUNGS:  Clear to auscultation and percussion.   HEA

## 2018-04-25 ENCOUNTER — TELEPHONE (OUTPATIENT)
Dept: FAMILY MEDICINE CLINIC | Facility: CLINIC | Age: 48
End: 2018-04-25

## 2018-04-25 NOTE — TELEPHONE ENCOUNTER
Informed [facility] that we have never seen this pt. He saw Bob Bonilla in January for a skin infection. Dr. Keyonna Parada did not see pt for a pre op.

## 2018-04-25 NOTE — CM/SW NOTE
4/25CM-Family [facility] is not able to accept the Patient. This Writer made referral to the below. Angelique Schwartz ([phone] was able to accept the Patient and will follow up with for start of services.        [facility] AT [facility] ([phone])   [facility]

## 2018-07-02 NOTE — OPERATIVE REPORT
Date of Surgery:  04/23/2018  Surgeon: Marcus Garibay MD  Anesthesia Type:  General  Pre-Op Diagnosis:     (1) AVNof left hip  Post-Op Diagnosis:     (1) AVNof left hip     Date of Surgery:  04/23/2018  Procedure Performed  left ANNA  Indications  AVN of the le hip as well as superior osteophyte formation. We made our femoral neck cut according to our preoperative template. We identified the acetabulum. We placed The retractors carefully and started reaming.   We reamed up in 1 mm increments up to a 53 and inse POSTOPERATIVE PLAN:  Weightbearing as tolerated. Deep venous thrombosis prophylaxis. Mobilization. Perioperative antibiotics. IMPLANTS: DePuy Trilock stem size 5 high offset with a 54 cup, 54 x 36 neutral liner, and a 36, +1.5 Delta ceramic head. Transposition Flap Text: The defect edges were debeveled with a #15 scalpel blade.  Given the location of the defect and the proximity to free margins a transposition flap was deemed most appropriate.  Using a sterile surgical marker, an appropriate transposition flap was drawn incorporating the defect.    The area thus outlined was incised deep to adipose tissue with a #15 scalpel blade.  The skin margins were undermined to an appropriate distance in all directions utilizing iris scissors.

## 2018-07-31 ENCOUNTER — PRIOR ORIGINAL RECORDS (OUTPATIENT)
Dept: OTHER | Age: 48
End: 2018-07-31

## 2018-08-29 LAB
ALBUMIN: 3.5 G/DL
ALKALINE PHOSPHATATE(ALK PHOS): 128 IU/L
BILIRUBIN TOTAL: 0.5 MG/DL
BNP: <21.4 PMOL/L
BUN: 15 MG/DL
CALCIUM: 8.5 MG/DL
CHLORIDE: 104 MEQ/L
CHOLESTEROL, TOTAL: 90 MG/DL
CREATININE, SERUM: 1.21 MG/DL
GLOBULIN: 3.4 G/DL
GLUCOSE: 119 MG/DL
HDL CHOLESTEROL: 31 MG/DL
HEMATOCRIT: 40.1 %
HEMOGLOBIN A1C: 5.8 %
HEMOGLOBIN: 13.3 G/DL
LDL CHOLESTEROL: 38 MG/DL
PLATELETS: 169 K/UL
POTASSIUM, SERUM: 3.6 MEQ/L
PROTEIN, TOTAL: 6.9 G/DL
RED BLOOD COUNT: 4.6 X 10-6/U
SGOT (AST): 17 IU/L
SGPT (ALT): 26 IU/L
SODIUM: 141 MEQ/L
THYROID STIMULATING HORMONE: 0.6 MLU/L
TRIGLYCERIDES: 107 MG/DL
WHITE BLOOD COUNT: 6.9 X 10-3/U

## 2018-09-05 ENCOUNTER — MYAURORA ACCOUNT LINK (OUTPATIENT)
Dept: OTHER | Age: 48
End: 2018-09-05

## 2018-09-05 ENCOUNTER — PRIOR ORIGINAL RECORDS (OUTPATIENT)
Dept: OTHER | Age: 48
End: 2018-09-05

## 2018-09-24 ENCOUNTER — OFFICE VISIT (OUTPATIENT)
Dept: FAMILY MEDICINE CLINIC | Facility: CLINIC | Age: 48
End: 2018-09-24
Payer: COMMERCIAL

## 2018-09-24 VITALS
HEIGHT: 67.5 IN | SYSTOLIC BLOOD PRESSURE: 124 MMHG | HEART RATE: 72 BPM | BODY MASS INDEX: 32.52 KG/M2 | WEIGHT: 209.63 LBS | TEMPERATURE: 98 F | RESPIRATION RATE: 18 BRPM | DIASTOLIC BLOOD PRESSURE: 74 MMHG

## 2018-09-24 DIAGNOSIS — I25.10 CORONARY ARTERY DISEASE INVOLVING NATIVE HEART WITHOUT ANGINA PECTORIS, UNSPECIFIED VESSEL OR LESION TYPE: ICD-10-CM

## 2018-09-24 DIAGNOSIS — L91.8 SKIN TAG: Primary | ICD-10-CM

## 2018-09-24 PROCEDURE — 99213 OFFICE O/P EST LOW 20 MIN: CPT | Performed by: FAMILY MEDICINE

## 2018-09-24 NOTE — PROGRESS NOTES
[facility] [place]  PROGRESS NOTE  Chief Complaint:   Patient presents with:  Skin: skin tag on L eye lid      HPI:   This is a 53year old male coming in for skin tag noted just to the left of his left eye which is been irritating for him as [facility] Hospital          Received:            04/24/2018 06:55 AM                                 Operating Room                                                               Pathologist:           Aure Sow MD Oscar Valdez MD at [address] [facility] ENDOSCOPY  4/23/2018: HIP TOTAL REPLACEMENT;  Left      Comment:  Performed by Basilia Harada, MD at [facility] OR  7/8/2014: ORCHIECTOMY        Comment:  Procedure: ORCHIECTOMY SIMPLE POSSIBLE RADICAL ADULT;                 Candy Mercado topically 2 (two) times daily. AAA sparingly twice a day as needed for 1 week, then twice a day a needed for itching Disp: 30 g Rfl: 0   lisinopril (PRINIVIL,ZESTRIL) 40 MG Oral Tab Take 40 mg by mouth nightly.  Disp:  Rfl:    torsemide (DEMADEX) 10 MG Oral 09/01/2018    Patient/Caregiver Education: Patient/Caregiver Education: There are no barriers to learning. Medical education done. Outcome: Patient verbalizes understanding.  Patient is notified to call with any questions, complications, allergies, or wor

## 2018-11-14 ENCOUNTER — PRIOR ORIGINAL RECORDS (OUTPATIENT)
Dept: OTHER | Age: 48
End: 2018-11-14

## 2018-11-15 ENCOUNTER — PRIOR ORIGINAL RECORDS (OUTPATIENT)
Dept: OTHER | Age: 48
End: 2018-11-15

## 2018-11-16 ENCOUNTER — PRIOR ORIGINAL RECORDS (OUTPATIENT)
Dept: OTHER | Age: 48
End: 2018-11-16

## 2018-11-19 ENCOUNTER — PRIOR ORIGINAL RECORDS (OUTPATIENT)
Dept: OTHER | Age: 48
End: 2018-11-19

## 2018-11-20 ENCOUNTER — OFFICE VISIT (OUTPATIENT)
Dept: FAMILY MEDICINE CLINIC | Facility: CLINIC | Age: 48
End: 2018-11-20
Payer: COMMERCIAL

## 2018-11-20 ENCOUNTER — PRIOR ORIGINAL RECORDS (OUTPATIENT)
Dept: OTHER | Age: 48
End: 2018-11-20

## 2018-11-20 VITALS
HEIGHT: 69 IN | DIASTOLIC BLOOD PRESSURE: 70 MMHG | SYSTOLIC BLOOD PRESSURE: 106 MMHG | TEMPERATURE: 98 F | RESPIRATION RATE: 18 BRPM | WEIGHT: 209 LBS | BODY MASS INDEX: 30.96 KG/M2 | HEART RATE: 52 BPM

## 2018-11-20 DIAGNOSIS — I25.10 CORONARY ARTERY DISEASE INVOLVING NATIVE HEART WITHOUT ANGINA PECTORIS, UNSPECIFIED VESSEL OR LESION TYPE: ICD-10-CM

## 2018-11-20 DIAGNOSIS — R94.31 ABNORMAL ELECTROCARDIOGRAM: ICD-10-CM

## 2018-11-20 DIAGNOSIS — G47.33 OSA (OBSTRUCTIVE SLEEP APNEA): Primary | ICD-10-CM

## 2018-11-20 PROCEDURE — 99244 OFF/OP CNSLTJ NEW/EST MOD 40: CPT | Performed by: FAMILY MEDICINE

## 2018-11-20 NOTE — PATIENT INSTRUCTIONS
If you have not heard from [facility] sleep [place] within the next 1 week please call the Sleep Lab at  [phone] or our office to help with scheduling. You should have heard from our precert department within the next 3-5 days.   IF you have not hea

## 2018-11-20 NOTE — PROGRESS NOTES
[facility]        HPI:   This is a 53year old male coming in for Patient presents with:  Consult: SUHA      HPI:   Pt is here to discuss his sleep. He states he sees doctor Christy Quiroga.    Pt states that he had a sleep s POSSIBLE RADICAL ADULT N/A 7/8/2014    Performed by Dean Teague DO at [facility] MAIN OR   • OTHER SURGICAL HISTORY  2014    skin grafting from LE to lissette area     Social History:  Social History    Social History Narrative      Works in purchasing and con Counseling given: Not Answered         Problem List:  Patient Active Problem List:     Cardiomyopathy [facility])     Hypertension     CAD (coronary artery disease)     Family history of premature CAD     Myocarditis ([facility] [address]     Cellulitis of groin     S/P drug present. No thyromegaly present. MAL 4  Tonsils 0    Cardiovascular: Normal rate, regular rhythm, normal heart sounds and intact distal pulses. Pulmonary/Chest: Effort normal and breath sounds normal. No stridor. Abdominal: Soft.  Bowel sounds are nor rate .     Advised patient to change filters,masks,hoses  and tubes and equiptment on a  regular schedule.   Filters and seals shall be changed every 1 month,  Hoses every 3 months,   CPAP mask and humidifier  chamber changed every 6 month  with the Durable

## 2018-11-21 ENCOUNTER — PRIOR ORIGINAL RECORDS (OUTPATIENT)
Dept: OTHER | Age: 48
End: 2018-11-21

## 2019-01-14 ENCOUNTER — SLEEP STUDY (OUTPATIENT)
Dept: FAMILY MEDICINE CLINIC | Facility: CLINIC | Age: 49
End: 2019-01-14
Payer: COMMERCIAL

## 2019-01-14 DIAGNOSIS — G47.33 OBSTRUCTIVE SLEEP APNEA: Primary | ICD-10-CM

## 2019-01-14 PROCEDURE — 95811 POLYSOM 6/>YRS CPAP 4/> PARM: CPT | Performed by: FAMILY MEDICINE

## 2019-02-08 ENCOUNTER — PRIOR ORIGINAL RECORDS (OUTPATIENT)
Dept: OTHER | Age: 49
End: 2019-02-08

## 2019-02-11 ENCOUNTER — PRIOR ORIGINAL RECORDS (OUTPATIENT)
Dept: OTHER | Age: 49
End: 2019-02-11

## 2019-02-19 LAB
ALBUMIN: 3.6 G/DL
ALKALINE PHOSPHATATE(ALK PHOS): 121 IU/L
BILIRUBIN TOTAL: 0.6 MG/DL
BNP: <21.4 PMOL/L
BUN: 16 MG/DL
CALCIUM: 8.8 MG/DL
CHLORIDE: 103 MEQ/L
CHOLESTEROL, TOTAL: 107 MG/DL
CREATININE, SERUM: 1.17 MG/DL
GLOBULIN: 3.7 G/DL
GLUCOSE: 134 MG/DL
HDL CHOLESTEROL: 29 MG/DL
HEMATOCRIT: 42.2 %
HEMOGLOBIN A1C: 6.2 %
HEMOGLOBIN: 14.3 G/DL
LDL CHOLESTEROL: 47 MG/DL
PLATELETS: 190 K/UL
POTASSIUM, SERUM: 3.5 MEQ/L
PROTEIN, TOTAL: 7.3 G/DL
RED BLOOD COUNT: 4.8 X 10-6/U
SGOT (AST): 21 IU/L
SGPT (ALT): 35 IU/L
SODIUM: 141 MEQ/L
THYROID STIMULATING HORMONE: 0.74 MLU/L
TRIGLYCERIDES: 154 MG/DL
WHITE BLOOD COUNT: 6.9 X 10-3/U

## 2019-02-28 VITALS
BODY MASS INDEX: 31.22 KG/M2 | DIASTOLIC BLOOD PRESSURE: 82 MMHG | HEART RATE: 71 BPM | HEIGHT: 68 IN | WEIGHT: 206 LBS | SYSTOLIC BLOOD PRESSURE: 140 MMHG

## 2019-02-28 VITALS
DIASTOLIC BLOOD PRESSURE: 80 MMHG | WEIGHT: 204 LBS | HEIGHT: 68 IN | SYSTOLIC BLOOD PRESSURE: 110 MMHG | BODY MASS INDEX: 30.92 KG/M2 | HEART RATE: 72 BPM

## 2019-02-28 RX ORDER — LORATADINE 10 MG/1
TABLET ORAL
COMMUNITY
Start: 2014-03-28

## 2019-02-28 RX ORDER — PANTOPRAZOLE SODIUM 40 MG/1
TABLET, DELAYED RELEASE ORAL
COMMUNITY
Start: 2018-11-16 | End: 2019-07-14 | Stop reason: SDUPTHER

## 2019-02-28 RX ORDER — CLOPIDOGREL BISULFATE 75 MG/1
TABLET ORAL
COMMUNITY
Start: 2019-01-29 | End: 2019-08-11 | Stop reason: SDUPTHER

## 2019-02-28 RX ORDER — TORSEMIDE 10 MG/1
TABLET ORAL
COMMUNITY
Start: 2018-10-11 | End: 2019-04-25 | Stop reason: SDUPTHER

## 2019-02-28 RX ORDER — HYDRALAZINE HYDROCHLORIDE 100 MG/1
TABLET, FILM COATED ORAL
COMMUNITY
Start: 2018-12-11 | End: 2019-09-06 | Stop reason: SDUPTHER

## 2019-02-28 RX ORDER — CARVEDILOL 25 MG/1
TABLET ORAL
COMMUNITY
Start: 2018-09-06 | End: 2019-05-22 | Stop reason: SDUPTHER

## 2019-02-28 RX ORDER — EPLERENONE 25 MG/1
TABLET, FILM COATED ORAL
COMMUNITY
Start: 2018-11-28 | End: 2020-01-30 | Stop reason: SDUPTHER

## 2019-02-28 RX ORDER — ATORVASTATIN CALCIUM 40 MG/1
TABLET, FILM COATED ORAL
COMMUNITY
Start: 2018-11-28 | End: 2019-05-19 | Stop reason: SDUPTHER

## 2019-02-28 RX ORDER — LISINOPRIL 40 MG/1
TABLET ORAL
COMMUNITY
Start: 2018-12-11 | End: 2019-03-28 | Stop reason: SDUPTHER

## 2019-03-01 VITALS
DIASTOLIC BLOOD PRESSURE: 60 MMHG | HEIGHT: 68 IN | BODY MASS INDEX: 32.28 KG/M2 | SYSTOLIC BLOOD PRESSURE: 118 MMHG | HEART RATE: 65 BPM | WEIGHT: 213 LBS

## 2019-03-06 ENCOUNTER — OFFICE VISIT (OUTPATIENT)
Dept: CARDIOLOGY | Age: 49
End: 2019-03-06

## 2019-03-06 ENCOUNTER — TELEPHONE (OUTPATIENT)
Dept: FAMILY MEDICINE CLINIC | Facility: CLINIC | Age: 49
End: 2019-03-06

## 2019-03-06 VITALS
HEIGHT: 69 IN | DIASTOLIC BLOOD PRESSURE: 64 MMHG | HEART RATE: 72 BPM | BODY MASS INDEX: 30.51 KG/M2 | SYSTOLIC BLOOD PRESSURE: 120 MMHG | WEIGHT: 206 LBS

## 2019-03-06 DIAGNOSIS — N18.9 ANEMIA DUE TO CHRONIC KIDNEY DISEASE, UNSPECIFIED CKD STAGE: ICD-10-CM

## 2019-03-06 DIAGNOSIS — I25.118 CORONARY ARTERY DISEASE OF NATIVE ARTERY OF NATIVE HEART WITH STABLE ANGINA PECTORIS (CMD): Primary | ICD-10-CM

## 2019-03-06 DIAGNOSIS — I42.0 DILATED CARDIOMYOPATHY (CMD): ICD-10-CM

## 2019-03-06 DIAGNOSIS — D63.1 ANEMIA DUE TO CHRONIC KIDNEY DISEASE, UNSPECIFIED CKD STAGE: ICD-10-CM

## 2019-03-06 PROBLEM — M25.559 HIP PAIN: Status: ACTIVE | Noted: 2018-04-23

## 2019-03-06 PROCEDURE — 3074F SYST BP LT 130 MM HG: CPT | Performed by: INTERNAL MEDICINE

## 2019-03-06 PROCEDURE — 99214 OFFICE O/P EST MOD 30 MIN: CPT | Performed by: INTERNAL MEDICINE

## 2019-03-06 PROCEDURE — 3078F DIAST BP <80 MM HG: CPT | Performed by: INTERNAL MEDICINE

## 2019-03-06 SDOH — HEALTH STABILITY: PHYSICAL HEALTH: ON AVERAGE, HOW MANY DAYS PER WEEK DO YOU ENGAGE IN MODERATE TO STRENUOUS EXERCISE (LIKE A BRISK WALK)?: 7 DAYS

## 2019-03-06 SDOH — HEALTH STABILITY: PHYSICAL HEALTH: ON AVERAGE, HOW MANY MINUTES DO YOU ENGAGE IN EXERCISE AT THIS LEVEL?: 20 MIN

## 2019-03-06 ASSESSMENT — ENCOUNTER SYMPTOMS
WEIGHT GAIN: 0
WEIGHT LOSS: 0
SUSPICIOUS LESIONS: 0
BRUISES/BLEEDS EASILY: 0
ALLERGIC/IMMUNOLOGIC COMMENTS: NO NEW FOOD ALLERGIES
HEMOPTYSIS: 0
CHILLS: 0
FEVER: 0
COUGH: 0
HEMATOCHEZIA: 0

## 2019-03-06 NOTE — TELEPHONE ENCOUNTER
[facility] but has not heard about any results yet. Wondering if Dr. Maximo Sandoval has had a chance to read the study? OTW for Dr. Maximo Sandoval tomorrow. Please advise.

## 2019-03-07 NOTE — TELEPHONE ENCOUNTER
All sleep studies from January and February are read. Please call Rachel Yuan to get copy of report. And schedule follow up appointment here.

## 2019-03-09 NOTE — TELEPHONE ENCOUNTER
Found sleep study and cpap order (packet)  This packet was in with a stack of other sleep papers in Dr. Karlie Swartz office  Unsure if this packet was ever faxed anywhere to have machine ordered  There is no phone note in chart regarding sleep study results    L

## 2019-03-11 ENCOUNTER — TELEPHONE (OUTPATIENT)
Dept: CARDIOLOGY | Age: 49
End: 2019-03-11

## 2019-03-15 ENCOUNTER — TELEPHONE (OUTPATIENT)
Dept: FAMILY MEDICINE CLINIC | Facility: CLINIC | Age: 49
End: 2019-03-15

## 2019-03-15 NOTE — TELEPHONE ENCOUNTER
Question:   Who gets the insurance approval for the CPAP machine  -[employer] ,  Insurance or Dr. Nati Sommers? ?  You can leave the answer on his voice mail .    Wants to double check that [facility] is in network for his insurance

## 2019-03-15 NOTE — TELEPHONE ENCOUNTER
Zachary Carty' will obtain insurance prior [facility] is in network with [employer] but if for some reason they are unable to process order for equipment they will notify the patient directly  Left detailed message informing patient of this    Jessi Jin, 03/15/19, 12

## 2019-03-15 NOTE — TELEPHONE ENCOUNTER
Consent on file to leave detailed voicemail on work number  Left detailed voicemail for patient concerning his cpap titration study results and recommendations  Cpap script and all related documents faxed to [employer] for setup  Patient advised to call with

## 2019-03-18 ENCOUNTER — TELEPHONE (OUTPATIENT)
Dept: CARDIOLOGY | Age: 49
End: 2019-03-18

## 2019-03-28 RX ORDER — LISINOPRIL 40 MG/1
40 TABLET ORAL DAILY
Qty: 270 TABLET | Refills: 3 | Status: SHIPPED | OUTPATIENT
Start: 2019-03-28 | End: 2020-01-05 | Stop reason: SDUPTHER

## 2019-04-01 ENCOUNTER — HOSPITAL ENCOUNTER (OUTPATIENT)
Dept: GENERAL RADIOLOGY | Age: 49
Discharge: HOME | End: 2019-04-01
Attending: NURSE PRACTITIONER
Payer: COMMERCIAL

## 2019-04-01 ENCOUNTER — OFFICE VISIT (OUTPATIENT)
Dept: FAMILY MEDICINE CLINIC | Facility: CLINIC | Age: 49
End: 2019-04-01
Payer: COMMERCIAL

## 2019-04-01 VITALS
HEIGHT: 67.5 IN | BODY MASS INDEX: 32.73 KG/M2 | OXYGEN SATURATION: 97 % | HEART RATE: 72 BPM | SYSTOLIC BLOOD PRESSURE: 130 MMHG | TEMPERATURE: 98 F | DIASTOLIC BLOOD PRESSURE: 80 MMHG | WEIGHT: 211 LBS | RESPIRATION RATE: 16 BRPM

## 2019-04-01 DIAGNOSIS — M79.652 PAIN OF LEFT THIGH: ICD-10-CM

## 2019-04-01 DIAGNOSIS — M79.652 PAIN OF LEFT THIGH: Primary | ICD-10-CM

## 2019-04-01 PROCEDURE — 99213 OFFICE O/P EST LOW 20 MIN: CPT | Performed by: NURSE PRACTITIONER

## 2019-04-01 PROCEDURE — 73552 X-RAY EXAM OF FEMUR 2/>: CPT | Performed by: NURSE PRACTITIONER

## 2019-04-01 NOTE — PROGRESS NOTES
HPI:    Patient ID: Renate Caballero is a 53year old male. HPI   Pain to the left thigh that started off and on for the past couple of weeks. Pain is more constant lately. Pain is below the hip and above the knee. Increase pain with walking.  No trauma or Physical Exam   Constitutional: He is oriented to person, place, and time. He appears well-developed and well-nourished. No distress. Musculoskeletal:        Legs:  Neurological: He is alert and oriented to person, place, and time.    Skin: Skin is warm a

## 2019-04-04 NOTE — PATIENT INSTRUCTIONS
Left femur:  No acute findings  Tylenol as needed for the pain. Return to clinic if worsens or not improving.

## 2019-04-08 ENCOUNTER — TELEPHONE (OUTPATIENT)
Dept: CARDIOLOGY | Age: 49
End: 2019-04-08

## 2019-04-17 ENCOUNTER — TELEPHONE (OUTPATIENT)
Dept: FAMILY MEDICINE CLINIC | Facility: CLINIC | Age: 49
End: 2019-04-17

## 2019-04-17 NOTE — TELEPHONE ENCOUNTER
Noted patient was sent up on his CPAP machine on 3/26/2019. LM for patient to return call to schedule a f/u appt.

## 2019-04-25 RX ORDER — TORSEMIDE 10 MG/1
TABLET ORAL
Qty: 90 TABLET | Refills: 3 | Status: SHIPPED | OUTPATIENT
Start: 2019-04-25 | End: 2020-04-29 | Stop reason: SDUPTHER

## 2019-04-26 ENCOUNTER — TELEPHONE (OUTPATIENT)
Dept: CARDIOLOGY | Age: 49
End: 2019-04-26

## 2019-05-01 ENCOUNTER — TELEPHONE (OUTPATIENT)
Dept: CARDIOLOGY | Age: 49
End: 2019-05-01

## 2019-05-01 DIAGNOSIS — I42.0 DILATED CARDIOMYOPATHY (CMD): ICD-10-CM

## 2019-05-01 DIAGNOSIS — I25.118 CORONARY ARTERY DISEASE OF NATIVE ARTERY OF NATIVE HEART WITH STABLE ANGINA PECTORIS (CMD): ICD-10-CM

## 2019-05-01 DIAGNOSIS — I50.1 CHRONIC LEFT-SIDED CONGESTIVE HEART FAILURE (CMD): Primary | ICD-10-CM

## 2019-05-01 DIAGNOSIS — I49.1 SUPRAVENTRICULAR PREMATURE BEATS: ICD-10-CM

## 2019-05-01 DIAGNOSIS — I42.3: ICD-10-CM

## 2019-05-01 DIAGNOSIS — I15.9 SECONDARY HYPERTENSION: ICD-10-CM

## 2019-05-01 DIAGNOSIS — R94.31 ABNORMAL ELECTROCARDIOGRAM: ICD-10-CM

## 2019-05-01 NOTE — TELEPHONE ENCOUNTER
Appt scheduled.      Future Appointments   Date Time Provider Shahnaz Logan   5/6/2019 11:00 AM Selena Philippe APRN [facility]

## 2019-05-06 ENCOUNTER — OFFICE VISIT (OUTPATIENT)
Dept: FAMILY MEDICINE CLINIC | Facility: CLINIC | Age: 49
End: 2019-05-06
Payer: COMMERCIAL

## 2019-05-06 VITALS
DIASTOLIC BLOOD PRESSURE: 74 MMHG | HEIGHT: 67.5 IN | HEART RATE: 76 BPM | TEMPERATURE: 97 F | RESPIRATION RATE: 18 BRPM | BODY MASS INDEX: 31.99 KG/M2 | SYSTOLIC BLOOD PRESSURE: 118 MMHG | WEIGHT: 206.19 LBS

## 2019-05-06 DIAGNOSIS — G47.33 OBSTRUCTIVE SLEEP APNEA (ADULT) (PEDIATRIC): Primary | ICD-10-CM

## 2019-05-06 PROCEDURE — 99213 OFFICE O/P EST LOW 20 MIN: CPT | Performed by: NURSE PRACTITIONER

## 2019-05-06 NOTE — PROGRESS NOTES
[facility]        HPI:   This is a 51year old male coming in for Patient presents with:  Obstructive Sleep Apnea (SUHA): follow up      HPI:     Therapy using a CPAP.   He did not really know the difference, but did n ESOPHAGOGASTRODUODENOSCOPY (EGD) N/A 7/14/2014    Performed by Des Mcnally MD at [address]  [facility] Left 4/23/2018    Performed by Cira Marcano MD at [address] [facility] OR   • [place] N/A 7/10/2014    P lisinopril (PRINIVIL,ZESTRIL) 40 MG Oral Tab Take 40 mg by mouth nightly. Disp:  Rfl:    torsemide (DEMADEX) 10 MG Oral Tab Take 5 mg by mouth daily. Take if weight goes up 2.5 lbs in day or 6 lbs in a week.   Goal weight 170 lbs   Disp:  Rfl:    hank well-nourished. No distress. HENT:   Head: Normocephalic and atraumatic.    Right Ear: Tympanic membrane, external ear and ear canal normal.   Left Ear: Tympanic membrane, external ear and ear canal normal.   Nose: Nose normal.   Mouth/Throat: Oropharynx abnormal heart rhythm  and death,  increased risk of driving accidents. Advised to refrain from driving when sleepy. COMPLIANCE is required by insurance for 4 hours a night most nights of the week.   Recommend weight loss, and maintain and optimal BMI

## 2019-05-09 ENCOUNTER — TELEPHONE (OUTPATIENT)
Dept: CARDIOLOGY | Age: 49
End: 2019-05-09

## 2019-05-13 ENCOUNTER — TELEPHONE (OUTPATIENT)
Dept: CARDIOLOGY | Age: 49
End: 2019-05-13

## 2019-05-14 ENCOUNTER — TELEPHONE (OUTPATIENT)
Dept: CARDIOLOGY | Age: 49
End: 2019-05-14

## 2019-05-21 ENCOUNTER — HOSPITAL ENCOUNTER (OUTPATIENT)
Dept: CV DIAGNOSTICS | Facility: HOSPITAL | Age: 49
Discharge: HOME | End: 2019-05-21
Attending: INTERNAL MEDICINE
Payer: COMMERCIAL

## 2019-05-21 DIAGNOSIS — D63.1 ANEMIA OF CHRONIC KIDNEY FAILURE: ICD-10-CM

## 2019-05-21 DIAGNOSIS — N18.9 ANEMIA OF CHRONIC KIDNEY FAILURE: ICD-10-CM

## 2019-05-21 DIAGNOSIS — I42.0 DILATED CARDIOMYOPATHY (HCC): ICD-10-CM

## 2019-05-21 DIAGNOSIS — I25.118 CORONARY ARTERY DISEASE INVOLVING NATIVE CORONARY ARTERY OF NATIVE HEART WITH OTHER FORM OF ANGINA PECTORIS (HCC): ICD-10-CM

## 2019-05-21 PROCEDURE — 93017 CV STRESS TEST TRACING ONLY: CPT | Performed by: INTERNAL MEDICINE

## 2019-05-21 PROCEDURE — 93018 CV STRESS TEST I&R ONLY: CPT | Performed by: INTERNAL MEDICINE

## 2019-05-21 PROCEDURE — 78452 HT MUSCLE IMAGE SPECT MULT: CPT | Performed by: INTERNAL MEDICINE

## 2019-05-21 RX ORDER — ATORVASTATIN CALCIUM 40 MG/1
TABLET, FILM COATED ORAL
Qty: 90 TABLET | Refills: 3 | Status: SHIPPED | OUTPATIENT
Start: 2019-05-21 | End: 2020-05-18

## 2019-05-22 RX ORDER — CARVEDILOL 25 MG/1
25 TABLET ORAL 2 TIMES DAILY
Qty: 180 TABLET | Refills: 3 | Status: SHIPPED | OUTPATIENT
Start: 2019-05-22 | End: 2019-06-04 | Stop reason: SDUPTHER

## 2019-05-30 ENCOUNTER — OFFICE VISIT (OUTPATIENT)
Dept: FAMILY MEDICINE CLINIC | Facility: CLINIC | Age: 49
End: 2019-05-30
Payer: COMMERCIAL

## 2019-05-30 VITALS
WEIGHT: 204 LBS | DIASTOLIC BLOOD PRESSURE: 78 MMHG | TEMPERATURE: 97 F | HEART RATE: 73 BPM | HEIGHT: 67.5 IN | RESPIRATION RATE: 18 BRPM | SYSTOLIC BLOOD PRESSURE: 124 MMHG | BODY MASS INDEX: 31.64 KG/M2

## 2019-05-30 DIAGNOSIS — R94.31 ABNORMAL ELECTROCARDIOGRAM: ICD-10-CM

## 2019-05-30 DIAGNOSIS — I49.1 PAC (PREMATURE ATRIAL CONTRACTION): ICD-10-CM

## 2019-05-30 DIAGNOSIS — Z01.818 PREOPERATIVE EXAMINATION: Primary | ICD-10-CM

## 2019-05-30 DIAGNOSIS — M87.051 AVASCULAR NECROSIS OF HIP, RIGHT (HCC): ICD-10-CM

## 2019-05-30 DIAGNOSIS — M25.551 RIGHT HIP PAIN: ICD-10-CM

## 2019-05-30 DIAGNOSIS — I25.10 CORONARY ARTERY DISEASE INVOLVING NATIVE HEART WITHOUT ANGINA PECTORIS, UNSPECIFIED VESSEL OR LESION TYPE: ICD-10-CM

## 2019-05-30 PROCEDURE — 93000 ELECTROCARDIOGRAM COMPLETE: CPT | Performed by: FAMILY MEDICINE

## 2019-05-30 PROCEDURE — 99244 OFF/OP CNSLTJ NEW/EST MOD 40: CPT | Performed by: FAMILY MEDICINE

## 2019-05-30 NOTE — PROGRESS NOTES
[facility]  [facility]    Chief Complaint:   Patient presents with:  Pre-Op Exam: R hip replacement      HPI:   Ian Mills is a 51year old male with a hx of R hip pain with avascular necrosis, CAD, who presents for a pre-operative p Disorder Father    • Cancer Father         prostate   • Hypertension Father    • Lipids Father    • Diabetes Mother    • Heart Disorder Maternal Grandmother    • Heart Disorder Maternal Grandfather    • Cancer Paternal Grandmother    • Heart Disorder Pater HPI  RESPIRATORY:  Denies shortness of breath, wheezing, cough or sputum. GASTROINTESTINAL:  Denies abdominal pain, nausea, vomiting, constipation, diarrhea, or blood in stool.   MUSCULOSKELETAL:  Denies weakness, muscle aches, back pain, joint pain, swell nondistended, nontender, bowel sounds normal in all 4 quadrants, no masses, no hepatosplenomegaly. MUSCULOSKELETAL: Normal ROM, no joint pain, or muscle weakness in all extremity. Slow gait. BACK: No tenderness, no spasm, SLR test negative, FROM[place] Cardiomyopathy ([facility] [address]     Hypertension     CAD (coronary artery disease)     Family history of premature CAD     Myocarditis ([facility] [address]     Cellulitis of groin     S/P drug eluting coronary stent placement     Anemia     E coli bacteremia     Abnormal electrocar

## 2019-05-30 NOTE — PATIENT INSTRUCTIONS
Patient will need cardiology clearance for surgery. Will discuss plavix with cardiologist.  Has appointment tomorrow with cardiologist.   Di Shelby done at [facility].   EKG today shows sinus rhythm with PAC.    Also recommend to monitor glucose befor

## 2019-05-31 ENCOUNTER — OFFICE VISIT (OUTPATIENT)
Dept: CARDIOLOGY | Age: 49
End: 2019-05-31

## 2019-05-31 VITALS
SYSTOLIC BLOOD PRESSURE: 132 MMHG | HEIGHT: 68 IN | BODY MASS INDEX: 30.92 KG/M2 | DIASTOLIC BLOOD PRESSURE: 74 MMHG | HEART RATE: 64 BPM | WEIGHT: 204 LBS

## 2019-05-31 DIAGNOSIS — I50.1 CHRONIC LEFT-SIDED CONGESTIVE HEART FAILURE (CMD): Primary | ICD-10-CM

## 2019-05-31 DIAGNOSIS — Z01.818 PREOPERATIVE CLEARANCE: ICD-10-CM

## 2019-05-31 PROCEDURE — 3075F SYST BP GE 130 - 139MM HG: CPT | Performed by: NURSE PRACTITIONER

## 2019-05-31 PROCEDURE — 99214 OFFICE O/P EST MOD 30 MIN: CPT | Performed by: NURSE PRACTITIONER

## 2019-05-31 PROCEDURE — 3078F DIAST BP <80 MM HG: CPT | Performed by: NURSE PRACTITIONER

## 2019-05-31 ASSESSMENT — NEW YORK HEART ASSOCIATION (NYHA) CLASSIFICATION: NYHA FUNCTIONAL CLASS: II

## 2019-05-31 ASSESSMENT — ENCOUNTER SYMPTOMS
WEIGHT GAIN: 0
CHILLS: 0
HEMOPTYSIS: 0
COUGH: 0
SUSPICIOUS LESIONS: 0
BRUISES/BLEEDS EASILY: 0
HEMATOCHEZIA: 0
FEVER: 0
ALLERGIC/IMMUNOLOGIC COMMENTS: NO NEW FOOD ALLERGIES
WEIGHT LOSS: 0

## 2019-06-04 RX ORDER — CARVEDILOL 25 MG/1
25 TABLET ORAL 2 TIMES DAILY
Qty: 180 TABLET | Refills: 3 | Status: SHIPPED | OUTPATIENT
Start: 2019-06-04 | End: 2020-07-30

## 2019-06-11 RX ORDER — PANTOPRAZOLE SODIUM 40 MG/1
TABLET, DELAYED RELEASE ORAL
Qty: 30 TABLET | Refills: 7 | OUTPATIENT
Start: 2019-06-11

## 2019-07-09 ENCOUNTER — TELEPHONE (OUTPATIENT)
Dept: FAMILY MEDICINE CLINIC | Facility: CLINIC | Age: 49
End: 2019-07-09

## 2019-07-09 NOTE — TELEPHONE ENCOUNTER
Received discharge summary from Dr. Brandon Elliott - pt s/p right hip arthroplasty.   Left detailed message for pt to call office to schedule f/u appt per Dr. Katz El request.

## 2019-07-09 NOTE — TELEPHONE ENCOUNTER
Pt called office to inform that he will not be released from surgeon care until next week or so. Pt stated will call back at later time to schedule f/u with Dr. Julio Guzman.   Pt states that he is doing well and has no concerns at this time but will f/u first with

## 2019-07-16 RX ORDER — PANTOPRAZOLE SODIUM 40 MG/1
TABLET, DELAYED RELEASE ORAL
Qty: 90 TABLET | Refills: 1 | Status: SHIPPED | OUTPATIENT
Start: 2019-07-16 | End: 2020-01-04 | Stop reason: SDUPTHER

## 2019-08-13 RX ORDER — CLOPIDOGREL BISULFATE 75 MG/1
TABLET ORAL
Qty: 90 TABLET | Refills: 1 | Status: SHIPPED | OUTPATIENT
Start: 2019-08-13 | End: 2020-01-26 | Stop reason: SDUPTHER

## 2019-09-06 ENCOUNTER — OFFICE VISIT (OUTPATIENT)
Dept: CARDIOLOGY | Age: 49
End: 2019-09-06

## 2019-09-06 ENCOUNTER — APPOINTMENT (OUTPATIENT)
Dept: CARDIOLOGY | Age: 49
End: 2019-09-06

## 2019-09-06 VITALS
SYSTOLIC BLOOD PRESSURE: 110 MMHG | WEIGHT: 206 LBS | BODY MASS INDEX: 31.22 KG/M2 | HEART RATE: 72 BPM | DIASTOLIC BLOOD PRESSURE: 70 MMHG | HEIGHT: 68 IN

## 2019-09-06 DIAGNOSIS — R53.83 FATIGUE, UNSPECIFIED TYPE: ICD-10-CM

## 2019-09-06 DIAGNOSIS — I25.118 CORONARY ARTERY DISEASE OF NATIVE ARTERY OF NATIVE HEART WITH STABLE ANGINA PECTORIS (CMD): Primary | ICD-10-CM

## 2019-09-06 DIAGNOSIS — R73.09 ELEVATED GLUCOSE: ICD-10-CM

## 2019-09-06 DIAGNOSIS — I51.4 MYOCARDITIS, UNSPECIFIED CHRONICITY, UNSPECIFIED MYOCARDITIS TYPE (CMD): ICD-10-CM

## 2019-09-06 DIAGNOSIS — R06.00 DYSPNEA, UNSPECIFIED TYPE: ICD-10-CM

## 2019-09-06 DIAGNOSIS — I50.1 CHRONIC LEFT-SIDED CONGESTIVE HEART FAILURE (CMD): ICD-10-CM

## 2019-09-06 DIAGNOSIS — E55.9 VITAMIN D DEFICIENCY: ICD-10-CM

## 2019-09-06 DIAGNOSIS — I42.0 DILATED CARDIOMYOPATHY (CMD): ICD-10-CM

## 2019-09-06 PROCEDURE — 3078F DIAST BP <80 MM HG: CPT | Performed by: NURSE PRACTITIONER

## 2019-09-06 PROCEDURE — 99213 OFFICE O/P EST LOW 20 MIN: CPT | Performed by: NURSE PRACTITIONER

## 2019-09-06 PROCEDURE — 3074F SYST BP LT 130 MM HG: CPT | Performed by: NURSE PRACTITIONER

## 2019-09-06 RX ORDER — HYDRALAZINE HYDROCHLORIDE 100 MG/1
TABLET, FILM COATED ORAL
Qty: 270 TABLET | Refills: 2 | Status: SHIPPED | OUTPATIENT
Start: 2019-09-06 | End: 2020-05-18

## 2019-09-06 ASSESSMENT — ENCOUNTER SYMPTOMS
HEMATOCHEZIA: 0
ALLERGIC/IMMUNOLOGIC COMMENTS: NO NEW FOOD ALLERGIES
WEIGHT GAIN: 0
COUGH: 0
WEIGHT LOSS: 0
BRUISES/BLEEDS EASILY: 0
CHILLS: 0
HEMOPTYSIS: 0
FEVER: 0
SUSPICIOUS LESIONS: 0

## 2019-09-06 ASSESSMENT — NEW YORK HEART ASSOCIATION (NYHA) CLASSIFICATION: NYHA FUNCTIONAL CLASS: II

## 2020-01-06 RX ORDER — LISINOPRIL 40 MG/1
TABLET ORAL
Qty: 90 TABLET | Refills: 3 | Status: SHIPPED | OUTPATIENT
Start: 2020-01-06 | End: 2020-11-16

## 2020-01-06 RX ORDER — PANTOPRAZOLE SODIUM 40 MG/1
TABLET, DELAYED RELEASE ORAL
Qty: 90 TABLET | Refills: 1 | Status: SHIPPED | OUTPATIENT
Start: 2020-01-06 | End: 2020-06-15

## 2020-01-22 ENCOUNTER — E-ADVICE (OUTPATIENT)
Dept: CARDIOLOGY | Age: 50
End: 2020-01-22

## 2020-01-28 RX ORDER — CLOPIDOGREL BISULFATE 75 MG/1
TABLET ORAL
Qty: 90 TABLET | Refills: 1 | Status: SHIPPED | OUTPATIENT
Start: 2020-01-28 | End: 2020-07-29

## 2020-01-30 RX ORDER — EPLERENONE 25 MG/1
TABLET, FILM COATED ORAL
Qty: 180 TABLET | Refills: 3 | Status: SHIPPED | OUTPATIENT
Start: 2020-01-30 | End: 2020-11-16

## 2020-02-04 LAB
25(OH)D3+25(OH)D2 SERPL-MCNC: 25.3 NG/ML
ABSOLUTE IMMATURE GRANULOCYTES (OFFPRE24): NORMAL
ALBUMIN SERPL-MCNC: 3.6 G/DL
ALBUMIN/GLOB SERPL: NORMAL {RATIO}
ALP SERPL-CCNC: 107 U/L
ALT SERPL-CCNC: 57 U/L
ANION GAP SERPL CALC-SCNC: NORMAL MMOL/L
AST SERPL-CCNC: 34 U/L
BASO+EOS+MONOS # BLD: NORMAL 10*3/UL
BASO+EOS+MONOS NFR BLD: NORMAL %
BASOPHILS # BLD: NORMAL 10*3/UL
BASOPHILS NFR BLD: NORMAL %
BILIRUB SERPL-MCNC: 0.6 MG/DL
BUN SERPL-MCNC: 14 MG/DL
BUN/CREAT SERPL: NORMAL
CALCIUM SERPL-MCNC: 8.6 MG/DL
CHLORIDE SERPL-SCNC: 103 MMOL/L
CHOLEST SERPL-MCNC: 101 MG/DL
CHOLEST/HDLC SERPL: NORMAL {RATIO}
CO2 SERPL-SCNC: NORMAL MMOL/L
CREAT SERPL-MCNC: 1.34 MG/DL
DIFFERENTIAL METHOD BLD: NORMAL
EOSINOPHIL # BLD: NORMAL 10*3/UL
EOSINOPHIL NFR BLD: NORMAL %
ERYTHROCYTE [DISTWIDTH] IN BLOOD: NORMAL %
EST. AVERAGE GLUCOSE BLD GHB EST-MCNC: NORMAL MG/DL
GLOBULIN SER-MCNC: 3.5 G/DL
GLUCOSE SERPL-MCNC: 148 MG/DL
HBA1C MFR BLD: 6.6 %
HBA1C MFR BLD: NORMAL % (ref 4.5–5.6)
HCT VFR BLD CALC: 42.5 %
HDLC SERPL-MCNC: 29 MG/DL
HGB BLD-MCNC: 14 G/DL
IMMATURE GRANULOCYTES (OFFPRE25): NORMAL
LDLC SERPL CALC-MCNC: 37 MG/DL
LENGTH OF FAST TIME PATIENT: NORMAL H
LENGTH OF FAST TIME PATIENT: NORMAL H
LYMPHOCYTES # BLD: NORMAL 10*3/UL
LYMPHOCYTES NFR BLD: NORMAL %
MCH RBC QN AUTO: NORMAL PG
MCHC RBC AUTO-ENTMCNC: NORMAL G/DL
MCV RBC AUTO: NORMAL FL
MONOCYTES # BLD: NORMAL 10*3/UL
MONOCYTES NFR BLD: NORMAL %
MPV (OFFPRE2): NORMAL
NEUTROPHILS # BLD: NORMAL 10*3/UL
NEUTROPHILS NFR BLD: NORMAL %
NONHDLC SERPL-MCNC: 72 MG/DL
NRBC BLD MANUAL-RTO: NORMAL %
PLAT MORPH BLD: NORMAL
PLATELET # BLD: 189 10*3/UL
POTASSIUM SERPL-SCNC: 3.8 MMOL/L
PROT SERPL-MCNC: 7.1 G/DL
RBC # BLD: 4.7 10*6/UL
RBC MORPH BLD: NORMAL
SODIUM SERPL-SCNC: 141 MMOL/L
TRIGL SERPL-MCNC: 176 MG/DL
TSH SERPL-ACNC: 0.56 M[IU]/L
VLDLC SERPL CALC-MCNC: NORMAL MG/DL
WBC # BLD: 7.7 10*3/UL
WBC MORPH BLD: NORMAL

## 2020-02-05 ENCOUNTER — TELEPHONE (OUTPATIENT)
Dept: CARDIOLOGY | Age: 50
End: 2020-02-05

## 2020-02-05 ENCOUNTER — E-ADVICE (OUTPATIENT)
Dept: CARDIOLOGY | Age: 50
End: 2020-02-05

## 2020-02-06 ENCOUNTER — CLINICAL ABSTRACT (OUTPATIENT)
Dept: CARDIOLOGY | Age: 50
End: 2020-02-06

## 2020-02-11 ENCOUNTER — TELEPHONE (OUTPATIENT)
Dept: CARDIOLOGY | Age: 50
End: 2020-02-11

## 2020-03-06 ENCOUNTER — APPOINTMENT (OUTPATIENT)
Dept: CARDIOLOGY | Age: 50
End: 2020-03-06

## 2020-04-27 ENCOUNTER — TELEPHONE (OUTPATIENT)
Dept: CARDIOLOGY | Age: 50
End: 2020-04-27

## 2020-04-27 ENCOUNTER — E-ADVICE (OUTPATIENT)
Dept: CARDIOLOGY | Age: 50
End: 2020-04-27

## 2020-04-29 ENCOUNTER — V-VISIT (OUTPATIENT)
Dept: CARDIOLOGY | Age: 50
End: 2020-04-29

## 2020-04-29 VITALS
HEART RATE: 83 BPM | DIASTOLIC BLOOD PRESSURE: 75 MMHG | WEIGHT: 205 LBS | BODY MASS INDEX: 31.07 KG/M2 | HEIGHT: 68 IN | SYSTOLIC BLOOD PRESSURE: 127 MMHG

## 2020-04-29 DIAGNOSIS — I50.22 CHRONIC SYSTOLIC CONGESTIVE HEART FAILURE (CMD): Primary | ICD-10-CM

## 2020-04-29 DIAGNOSIS — N18.9 ANEMIA DUE TO CHRONIC KIDNEY DISEASE, UNSPECIFIED CKD STAGE: ICD-10-CM

## 2020-04-29 DIAGNOSIS — E78.5 HYPERLIPIDEMIA, UNSPECIFIED HYPERLIPIDEMIA TYPE: ICD-10-CM

## 2020-04-29 DIAGNOSIS — I25.118 CORONARY ARTERY DISEASE OF NATIVE ARTERY OF NATIVE HEART WITH STABLE ANGINA PECTORIS (CMD): ICD-10-CM

## 2020-04-29 DIAGNOSIS — I51.4 MYOCARDITIS, UNSPECIFIED CHRONICITY, UNSPECIFIED MYOCARDITIS TYPE (CMD): ICD-10-CM

## 2020-04-29 DIAGNOSIS — D63.1 ANEMIA DUE TO CHRONIC KIDNEY DISEASE, UNSPECIFIED CKD STAGE: ICD-10-CM

## 2020-04-29 DIAGNOSIS — R94.31 ABNORMAL ELECTROCARDIOGRAM: ICD-10-CM

## 2020-04-29 DIAGNOSIS — I15.9 SECONDARY HYPERTENSION: ICD-10-CM

## 2020-04-29 PROCEDURE — 99214 OFFICE O/P EST MOD 30 MIN: CPT | Performed by: INTERNAL MEDICINE

## 2020-04-29 PROCEDURE — 3074F SYST BP LT 130 MM HG: CPT | Performed by: INTERNAL MEDICINE

## 2020-04-29 PROCEDURE — 3078F DIAST BP <80 MM HG: CPT | Performed by: INTERNAL MEDICINE

## 2020-04-29 RX ORDER — TORSEMIDE 10 MG/1
TABLET ORAL
Qty: 90 TABLET | Refills: 3 | Status: SHIPPED | OUTPATIENT
Start: 2020-04-29 | End: 2020-10-14 | Stop reason: DRUGHIGH

## 2020-04-29 ASSESSMENT — PATIENT HEALTH QUESTIONNAIRE - PHQ9
2. FEELING DOWN, DEPRESSED OR HOPELESS: NOT AT ALL
1. LITTLE INTEREST OR PLEASURE IN DOING THINGS: NOT AT ALL
SUM OF ALL RESPONSES TO PHQ9 QUESTIONS 1 AND 2: 0
SUM OF ALL RESPONSES TO PHQ9 QUESTIONS 1 AND 2: 0

## 2020-04-29 ASSESSMENT — ENCOUNTER SYMPTOMS
WEIGHT LOSS: 0
CHILLS: 0
COUGH: 0
FEVER: 0
WEIGHT GAIN: 0
ALLERGIC/IMMUNOLOGIC COMMENTS: NO NEW FOOD ALLERGIES
BRUISES/BLEEDS EASILY: 0
HEMOPTYSIS: 0
SUSPICIOUS LESIONS: 0
HEMATOCHEZIA: 0

## 2020-05-18 RX ORDER — ATORVASTATIN CALCIUM 40 MG/1
TABLET, FILM COATED ORAL
Qty: 90 TABLET | Refills: 3 | Status: SHIPPED | OUTPATIENT
Start: 2020-05-18 | End: 2021-04-16

## 2020-05-18 RX ORDER — HYDRALAZINE HYDROCHLORIDE 100 MG/1
TABLET, FILM COATED ORAL
Qty: 270 TABLET | Refills: 2 | Status: SHIPPED | OUTPATIENT
Start: 2020-05-18 | End: 2020-11-19

## 2020-06-15 RX ORDER — PANTOPRAZOLE SODIUM 40 MG/1
TABLET, DELAYED RELEASE ORAL
Qty: 90 TABLET | Refills: 3 | Status: SHIPPED | OUTPATIENT
Start: 2020-06-15 | End: 2021-02-09

## 2020-07-29 RX ORDER — CLOPIDOGREL BISULFATE 75 MG/1
TABLET ORAL
Qty: 90 TABLET | Refills: 3 | Status: SHIPPED | OUTPATIENT
Start: 2020-07-29

## 2020-07-30 RX ORDER — CARVEDILOL 25 MG/1
TABLET ORAL
Qty: 180 TABLET | Refills: 3 | Status: SHIPPED | OUTPATIENT
Start: 2020-07-30

## 2020-08-10 ENCOUNTER — TELEPHONE (OUTPATIENT)
Dept: CARDIOLOGY | Age: 50
End: 2020-08-10

## 2020-08-10 DIAGNOSIS — I15.9 SECONDARY HYPERTENSION: ICD-10-CM

## 2020-08-10 DIAGNOSIS — I50.1 CHRONIC LEFT-SIDED CONGESTIVE HEART FAILURE (CMD): ICD-10-CM

## 2020-08-10 DIAGNOSIS — I42.0 DILATED CARDIOMYOPATHY (CMD): ICD-10-CM

## 2020-08-10 DIAGNOSIS — E78.5 HYPERLIPIDEMIA, UNSPECIFIED HYPERLIPIDEMIA TYPE: Primary | ICD-10-CM

## 2020-08-10 DIAGNOSIS — Z82.49 FAMILY HISTORY OF PREMATURE CAD: ICD-10-CM

## 2020-08-10 DIAGNOSIS — I25.10 CORONARY ARTERY DISEASE INVOLVING NATIVE HEART WITHOUT ANGINA PECTORIS, UNSPECIFIED VESSEL OR LESION TYPE: ICD-10-CM

## 2020-08-10 DIAGNOSIS — Z95.5 S/P DRUG ELUTING CORONARY STENT PLACEMENT: ICD-10-CM

## 2020-10-05 ENCOUNTER — TELEPHONE (OUTPATIENT)
Dept: CARDIOLOGY | Age: 50
End: 2020-10-05

## 2020-10-14 ENCOUNTER — TELEPHONE (OUTPATIENT)
Dept: CARDIOLOGY | Age: 50
End: 2020-10-14

## 2020-10-14 ENCOUNTER — VIRTUAL PHONE E/M (OUTPATIENT)
Dept: FAMILY MEDICINE CLINIC | Facility: CLINIC | Age: 50
End: 2020-10-14
Payer: COMMERCIAL

## 2020-10-14 ENCOUNTER — TELEPHONE (OUTPATIENT)
Dept: FAMILY MEDICINE CLINIC | Facility: CLINIC | Age: 50
End: 2020-10-14

## 2020-10-14 VITALS
DIASTOLIC BLOOD PRESSURE: 86 MMHG | BODY MASS INDEX: 30 KG/M2 | SYSTOLIC BLOOD PRESSURE: 122 MMHG | HEART RATE: 80 BPM | WEIGHT: 194 LBS

## 2020-10-14 DIAGNOSIS — R63.4 WEIGHT LOSS: ICD-10-CM

## 2020-10-14 DIAGNOSIS — R19.7 DIARRHEA, UNSPECIFIED TYPE: Primary | ICD-10-CM

## 2020-10-14 DIAGNOSIS — R53.83 FATIGUE, UNSPECIFIED TYPE: ICD-10-CM

## 2020-10-14 PROCEDURE — 3079F DIAST BP 80-89 MM HG: CPT | Performed by: NURSE PRACTITIONER

## 2020-10-14 PROCEDURE — 3074F SYST BP LT 130 MM HG: CPT | Performed by: NURSE PRACTITIONER

## 2020-10-14 PROCEDURE — 99443 PHONE E/M BY PHYS 21-30 MIN: CPT | Performed by: NURSE PRACTITIONER

## 2020-10-14 RX ORDER — TORSEMIDE 10 MG/1
10 TABLET ORAL PRN
COMMUNITY
End: 2021-04-26

## 2020-10-14 RX ORDER — PANTOPRAZOLE SODIUM 40 MG/1
40 TABLET, DELAYED RELEASE ORAL
COMMUNITY
Start: 2020-08-30

## 2020-10-14 NOTE — TELEPHONE ENCOUNTER
Symptoms started 10 days ago with nausea, diarrhea, fatigue and weakness. Patient states he does have a cough on and off. No fever or SOB. Scheduled virtual visit with Marisol Daily.    Future Appointments   Date Time Provider Shahnaz Logan   10/14/2020 11:

## 2020-10-14 NOTE — PROGRESS NOTES
Virtual Telephone Check-In    Tammy Moura verbally consents to a Virtual/Telephone Check-In visit on 10/14/20. Patient has been referred to the [facility] website at [email or web address] to review the yearly Consent to Treat document.     Patient Elías Carr APRN

## 2020-10-14 NOTE — PATIENT INSTRUCTIONS
Integrate Pedialyte into diet  [place] diet (boiled beef, chicken) bananas, avoid dairy products  Probiotic twice a day  Continue pantoprazole once a day at this time  Come in for office visit tomorrow, anticipate labs  Notify cardiology office regarding sym

## 2020-10-15 ENCOUNTER — OFFICE VISIT (OUTPATIENT)
Dept: FAMILY MEDICINE CLINIC | Facility: CLINIC | Age: 50
End: 2020-10-15
Payer: COMMERCIAL

## 2020-10-15 ENCOUNTER — APPOINTMENT (OUTPATIENT)
Dept: ULTRASOUND IMAGING | Facility: HOSPITAL | Age: 50
DRG: 637 | End: 2020-10-15
Attending: STUDENT IN AN ORGANIZED HEALTH CARE EDUCATION/TRAINING PROGRAM
Payer: COMMERCIAL

## 2020-10-15 ENCOUNTER — TELEPHONE (OUTPATIENT)
Dept: FAMILY MEDICINE CLINIC | Facility: CLINIC | Age: 50
End: 2020-10-15

## 2020-10-15 ENCOUNTER — APPOINTMENT (OUTPATIENT)
Dept: GENERAL RADIOLOGY | Facility: HOSPITAL | Age: 50
DRG: 637 | End: 2020-10-15
Attending: INTERNAL MEDICINE
Payer: COMMERCIAL

## 2020-10-15 ENCOUNTER — HOSPITAL ENCOUNTER (INPATIENT)
Facility: HOSPITAL | Age: 50
LOS: 3 days | Discharge: HOME | DRG: 637 | End: 2020-10-18
Attending: EMERGENCY MEDICINE | Admitting: INTERNAL MEDICINE
Payer: COMMERCIAL

## 2020-10-15 VITALS
SYSTOLIC BLOOD PRESSURE: 96 MMHG | BODY MASS INDEX: 30.71 KG/M2 | TEMPERATURE: 97 F | WEIGHT: 198 LBS | DIASTOLIC BLOOD PRESSURE: 64 MMHG | HEART RATE: 79 BPM | HEIGHT: 67.5 IN

## 2020-10-15 DIAGNOSIS — R53.83 FATIGUE, UNSPECIFIED TYPE: Primary | ICD-10-CM

## 2020-10-15 DIAGNOSIS — R19.7 DIARRHEA, UNSPECIFIED TYPE: ICD-10-CM

## 2020-10-15 DIAGNOSIS — I50.1 CHRONIC LEFT-SIDED CONGESTIVE HEART FAILURE (HCC): ICD-10-CM

## 2020-10-15 DIAGNOSIS — E11.00 UNCONTROLLED TYPE 2 DIABETES MELLITUS WITH HYPEROSMOLAR NONKETOTIC HYPERGLYCEMIA (HCC): Primary | ICD-10-CM

## 2020-10-15 DIAGNOSIS — R79.89 ELEVATED LFTS: ICD-10-CM

## 2020-10-15 DIAGNOSIS — R63.4 WEIGHT LOSS: ICD-10-CM

## 2020-10-15 DIAGNOSIS — I42.0 DILATED CARDIOMYOPATHY (HCC): ICD-10-CM

## 2020-10-15 DIAGNOSIS — U07.1 COVID-19 VIRUS INFECTION: ICD-10-CM

## 2020-10-15 PROBLEM — E87.5 HYPERKALEMIA: Status: ACTIVE | Noted: 2020-10-15

## 2020-10-15 PROBLEM — E87.1 HYPONATREMIA: Status: ACTIVE | Noted: 2020-10-15

## 2020-10-15 PROBLEM — R73.9 HYPERGLYCEMIA: Status: ACTIVE | Noted: 2020-10-15

## 2020-10-15 PROCEDURE — 3078F DIAST BP <80 MM HG: CPT | Performed by: STUDENT IN AN ORGANIZED HEALTH CARE EDUCATION/TRAINING PROGRAM

## 2020-10-15 PROCEDURE — 3074F SYST BP LT 130 MM HG: CPT | Performed by: STUDENT IN AN ORGANIZED HEALTH CARE EDUCATION/TRAINING PROGRAM

## 2020-10-15 PROCEDURE — 71045 X-RAY EXAM CHEST 1 VIEW: CPT | Performed by: INTERNAL MEDICINE

## 2020-10-15 PROCEDURE — 3008F BODY MASS INDEX DOCD: CPT | Performed by: NURSE PRACTITIONER

## 2020-10-15 PROCEDURE — 99291 CRITICAL CARE FIRST HOUR: CPT | Performed by: STUDENT IN AN ORGANIZED HEALTH CARE EDUCATION/TRAINING PROGRAM

## 2020-10-15 PROCEDURE — 3074F SYST BP LT 130 MM HG: CPT | Performed by: NURSE PRACTITIONER

## 2020-10-15 PROCEDURE — 3008F BODY MASS INDEX DOCD: CPT | Performed by: STUDENT IN AN ORGANIZED HEALTH CARE EDUCATION/TRAINING PROGRAM

## 2020-10-15 PROCEDURE — 99215 OFFICE O/P EST HI 40 MIN: CPT | Performed by: NURSE PRACTITIONER

## 2020-10-15 PROCEDURE — 76700 US EXAM ABDOM COMPLETE: CPT | Performed by: STUDENT IN AN ORGANIZED HEALTH CARE EDUCATION/TRAINING PROGRAM

## 2020-10-15 PROCEDURE — 3078F DIAST BP <80 MM HG: CPT | Performed by: NURSE PRACTITIONER

## 2020-10-15 RX ORDER — HEPARIN SODIUM 5000 [USP'U]/ML
5000 INJECTION, SOLUTION INTRAVENOUS; SUBCUTANEOUS EVERY 8 HOURS SCHEDULED
Status: DISCONTINUED | OUTPATIENT
Start: 2020-10-15 | End: 2020-10-18

## 2020-10-15 RX ORDER — BISACODYL 10 MG
10 SUPPOSITORY, RECTAL RECTAL
Status: DISCONTINUED | OUTPATIENT
Start: 2020-10-15 | End: 2020-10-18

## 2020-10-15 RX ORDER — POLYETHYLENE GLYCOL 3350 17 G/17G
17 POWDER, FOR SOLUTION ORAL DAILY PRN
Status: DISCONTINUED | OUTPATIENT
Start: 2020-10-15 | End: 2020-10-18

## 2020-10-15 RX ORDER — CLOPIDOGREL BISULFATE 75 MG/1
75 TABLET ORAL DAILY
Status: DISCONTINUED | OUTPATIENT
Start: 2020-10-16 | End: 2020-10-18

## 2020-10-15 RX ORDER — HYDRALAZINE HYDROCHLORIDE 50 MG/1
100 TABLET, FILM COATED ORAL 3 TIMES DAILY
Status: DISCONTINUED | OUTPATIENT
Start: 2020-10-15 | End: 2020-10-18

## 2020-10-15 RX ORDER — DEXTROSE MONOHYDRATE 25 G/50ML
50 INJECTION, SOLUTION INTRAVENOUS
Status: DISCONTINUED | OUTPATIENT
Start: 2020-10-15 | End: 2020-10-18

## 2020-10-15 RX ORDER — PANTOPRAZOLE SODIUM 40 MG/1
40 TABLET, DELAYED RELEASE ORAL
Status: DISCONTINUED | OUTPATIENT
Start: 2020-10-16 | End: 2020-10-18

## 2020-10-15 RX ORDER — SODIUM CHLORIDE 9 MG/ML
INJECTION, SOLUTION INTRAVENOUS CONTINUOUS
Status: DISCONTINUED | OUTPATIENT
Start: 2020-10-15 | End: 2020-10-16

## 2020-10-15 RX ORDER — ONDANSETRON 2 MG/ML
4 INJECTION INTRAMUSCULAR; INTRAVENOUS EVERY 6 HOURS PRN
Status: DISCONTINUED | OUTPATIENT
Start: 2020-10-15 | End: 2020-10-18

## 2020-10-15 RX ORDER — ACETAMINOPHEN 325 MG/1
650 TABLET ORAL EVERY 6 HOURS PRN
Status: DISCONTINUED | OUTPATIENT
Start: 2020-10-15 | End: 2020-10-18

## 2020-10-15 RX ORDER — ATORVASTATIN CALCIUM 40 MG/1
40 TABLET, FILM COATED ORAL NIGHTLY
Status: DISCONTINUED | OUTPATIENT
Start: 2020-10-15 | End: 2020-10-18

## 2020-10-15 RX ORDER — SODIUM PHOSPHATE, DIBASIC AND SODIUM PHOSPHATE, MONOBASIC 7; 19 G/133ML; G/133ML
1 ENEMA RECTAL ONCE AS NEEDED
Status: DISCONTINUED | OUTPATIENT
Start: 2020-10-15 | End: 2020-10-18

## 2020-10-15 NOTE — PROGRESS NOTES
[facility] [place]  PROGRESS NOTE  Chief Complaint:   Patient presents with:  Weight Loss  Diarrhea: last 2 days solid      HPI:   This is a 53year old male coming in for follow up.     Follow up from phone visit yesterday, symptoms started daily with 6 stents   • ESOPHAGOGASTRODUODENOSCOPY (EGD) N/A 7/14/2014    Performed by Marie Santiago MD at [address] Left 4/23/2018    Performed by Ramos Christensen MD at [address] [facility] OR   • Beverly incontinence. MUSCULOSKELETAL:  Denies muscle aches, back pain, joint pain, swelling or stiffness. Reports generalized weakness.   NEUROLOGICAL:  Denies headache, seizures, dizziness, syncope, paralysis, ataxia, numbness or tingling in the extremities,ch murmurs, rubs or gallops. LUNGS: Clear to auscultation bilterally, no rales/rhonchi/wheezing. ABDOMEN:  Soft, rounded, nondistended, nontender, bowel sounds normal in all 4 quadrants, no masses, no hepatosplenomegaly, no inguinal lymphadenopathy.   EXTREM worsening or changing symptoms. Patient is to call with any side effects or complications from the treatments as a result of today.      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

## 2020-10-15 NOTE — PATIENT INSTRUCTIONS
Labs today. Continue holding your torsemide as instructed by Dr. Osiris Paul. Continue to push fluids and small meals. Try some sour candies to see if this helps with your dry mouth.   Try brushing your teeth a few times during the day to see if this helps

## 2020-10-15 NOTE — TELEPHONE ENCOUNTER
Labs reviewed with patient in care everywhere. Glucose 605, sodium down to 122. Potassium on high normal of 5.1. Renal function altered compared to prior. Liver enzymes elevated. Lipase elevated at 136.   WBC elevated at 9.6 with abnormal differential

## 2020-10-15 NOTE — CONSULTS
[facility]  Report of Consultation    Nachojennifer Albai Patient Status:  Inpatient    1970 MRN [id number]   [facility] 4SW-A Attending Virginia Mandel, DO   Hosp Day # 0 PCP Seven Roldan MD     Reason for Consultation:  ICU management 2014   • Sleep apnea     diagnosed but no followup   • Visual impairment      Family History   Problem Relation Age of Onset   • Heart Disorder Father    • Cancer Father         prostate   • Hypertension Father    • Lipids Father    • Diabetes Mother    • breath  Cardiovascular: Denies any palpitations or chest pain  Gastrointestinal: As above  Musculoskeletal: Has had no lower extremity edema or pain  Neurological: Denies any symptoms     All other review of systems are negative. --Uses his CPAP nightly [facility] ALKPHO 134 10/15/2020    BILT 0.7 10/15/2020    TP 7.2 10/15/2020    AST 70 10/15/2020     10/15/2020     10/15/2020    DDIMER 0.40 10/15/2020    PGLU 447 10/15/2020       Cultures:   Rapid Covid is positive  Blood cultures are pending  Urine of the abdomen is pending  · We will check chest x-ray  · We will trend inflammatory markers--follow for need for treatment options for Covid  · Continue GI and DVT prophylaxis    All of the above was discussed at length with the patient at bedside we will

## 2020-10-15 NOTE — ED PROVIDER NOTES
Patient Seen in: [facility] Emergency Department      History   Patient presents with:  Abnormal Result    Stated Complaint: abnormal results  - glucose 605, Na 122, K 5.1, Creat 1.5, Lipase 136    HPI    This is a 28-year-old man history of congest skin grafting from LE to lissette area                    Social History    Tobacco Use      Smoking status: Never Smoker      Smokeless tobacco: Never Used    Alcohol use: No    Drug use:  No             Review of Systems    Positive for stated complaint: abno VENOUS BLOOD GAS - Abnormal; Notable for the following components:    Venous O2 Saturation 52 (*)     Venous O2 Sat.  Calc. 57 (*)     All other components within normal limits   LIPASE - Abnormal; Notable for the following components:    Lipase 626 (*) present. Will give IV fluids, start patient on insulin drip, admit to the ICU for further monitoring and treatment, discussed with the [facility] hospitalist who requested cardiology on board given patient's heart failure history.   Case was discussed with raymond

## 2020-10-15 NOTE — PLAN OF CARE
Pt arrived from ER a/ox4, very pleasant, on RA, able to walk to bathroom and get into the bed on his own on arrival.     Vss. Afebrile. New consult to ID; spoke with dr Fatou Arroyo, no new orders. Phleb at bedside for bc x2.      Arrived on insulin gtt at 9 units Excision Method: Fusiform

## 2020-10-15 NOTE — ED INITIAL ASSESSMENT (HPI)
Sent in for elevated abnormal lab results from this morning. Patient has been fighting a stomach \"issue\" for the last 2 weeks. Patient is feeling better with his stomach issues but still has loss of appetite and bloating. Denies fever, or vomiting.  Had d

## 2020-10-15 NOTE — H&P
[facility]  History and Physical     Bruce Haseeb Patient Status:  Emergency    1970 MRN [id number]   Location [address] Attending Ortega Chandra MD   Hosp Day # 0 PCP Blanco Owusu MD     Chief Complaint: [facility] Hypertension Father    • Lipids Father    • Diabetes Mother    • Heart Disorder Maternal Grandmother    • Heart Disorder Maternal Grandfather    • Cancer Paternal Grandmother    • Heart Disorder Paternal Grandfather     reviewed    Allergies:   Seasonal neurological deficits. CNII-XII grossly intact. Musculoskeletal: Moves all extremities. Extremities: No edema or cyanosis. Integument: No rashes or lesions. Psychiatric: Appropriate mood and affect.       Diagnostic Data:      Labs:  Recent Labs   Lab

## 2020-10-16 PROCEDURE — 99233 SBSQ HOSP IP/OBS HIGH 50: CPT | Performed by: CLINICAL NURSE SPECIALIST

## 2020-10-16 PROCEDURE — 99233 SBSQ HOSP IP/OBS HIGH 50: CPT | Performed by: HOSPITALIST

## 2020-10-16 PROCEDURE — 99441 TELEPHONE E&M BY PHYSICIAN EST PT NOT ORIG PREV 7 DAYS 5-10 MIN: CPT | Performed by: INTERNAL MEDICINE

## 2020-10-16 RX ORDER — BLOOD SUGAR DIAGNOSTIC
STRIP MISCELLANEOUS
Qty: 100 STRIP | Refills: 2 | Status: SHIPPED | OUTPATIENT
Start: 2020-10-16 | End: 2020-10-16

## 2020-10-16 RX ORDER — BLOOD-GLUCOSE METER
1 EACH MISCELLANEOUS
Qty: 1 KIT | Refills: 0 | Status: SHIPPED | OUTPATIENT
Start: 2020-10-16

## 2020-10-16 RX ORDER — LANCETS
EACH MISCELLANEOUS
Qty: 100 EACH | Refills: 2 | Status: SHIPPED | OUTPATIENT
Start: 2020-10-16 | End: 2020-10-21

## 2020-10-16 RX ORDER — BLOOD SUGAR DIAGNOSTIC
STRIP MISCELLANEOUS
Qty: 100 STRIP | Refills: 2 | Status: SHIPPED | OUTPATIENT
Start: 2020-10-16 | End: 2020-10-21

## 2020-10-16 NOTE — PAYOR COMM NOTE
--------------  ADMISSION REVIEW     Payor: MARY [address] #:  [id number]  Authorization Number: [id number]    Admit date: 10/15/20  Admit time: 1626       Admitting Physician: Alice Lanza DO  Attending Physician:  Chrystal Oppenheim, Renford Lather Procedure Laterality Date   • ANGIOGRAM     • ANGIOPLASTY (CORONARY)      with 6 stents   • ESOPHAGOGASTRODUODENOSCOPY (EGD) N/A 7/14/2014    Performed by Venita Hall MD at [address] [facility] Left 4/23/2018    Performed by JIMMY 24 (*)     Creatinine 1.99 (*)     GFR, Non- 38 (*)     GFR, -American 44 (*)     AST 70 (*)      (*)     Alkaline Phosphatase 134 (*)     A/G Ratio 0.9 (*)     All other components within normal limits   URINALYSIS WITH [facility] hospitalist who requested cardiology on board given patient's heart failure history. Case was discussed with cardiology, Dr. Junior Dupont covering for patient's cardiologist Dr. Rachna Mireles. Endorsed to pulmonary, Dr. Rachna Mireles.     Patient is Covid positive patie and no dyspnea. He admits to feeling thirsty otherwise no other complaints. Pt not known to be DM. Allergies:   Seasonal                    Review of Systems:   A comprehensive 14 point review of systems was completed.     Pertinent positives and negat known DM- will image A/P to look at pancreas especially with elevated lipase  2. A1c  3. Endo on Cs, insulin gtt  5. H/o HfPEF  6. CAD s/p multiple stents in the past   7. Obesity  8. Pseudohyponatremia due to hyperglycemia   9. Elevated LFT , lipase- ?  Bi 108.0*     Lab 10/15/20  1401 10/15/20  1805 10/15/20  2147 10/16/20  0537   * 356* 156* 181*   BUN 24* 20* 19* 16   CREATSERUM 1.99* 1.46* 1.17 1.07   GFRAA 44* 64 84 94   GFRNAA 38* 56* 73 81   CA 8.6 8.0* 8.1* 7.8*   ALB 3.5  --   --  2.9*   NA 1 indication for steroids     2. DM newly diagnosed, hyperglycemia 779,. A1C 13%  Possible viral induced panreatitis? On insulin per endo     3.  WIL  Latoya Simon MD  10/16/2020 10:28 AM    Endocrinology Consultation  Reason for Consultation:  Jana Hatch Byron Alvarenga RN    10/16/2020 5806 Given 100 mg Oral FogartyLuanne RACEHLLE Rouse    10/15/2020 2224 Given 100 mg Oral Yefri Hilario RN      Insulin Aspart Pen (NOVOLOG) 100 UNIT/ML flexpen 1-50 Units     Date Action Dose Route User    10/16/2020 0415 Give (PROTONIX) EC tab 40 mg     Date Action Dose Route User    10/16/2020 0855 Given 40 mg Oral Vicki Stinson RN      0.9% NaCl infusion     Date Action Dose Route User    10/15/2020 2043 Rate/Dose Change (none) Intravenous Deng Anna RN    10/15/202

## 2020-10-16 NOTE — PLAN OF CARE
Assumed care at shift change. Patient alert and oriented x4. Moves all extremities equally. Pupils equal and reactive. On room air. SR per tele. BP stable. Pulses palpable. SCDs on. Switched to QID accucheck.  Had patient check his own blood sugar with our

## 2020-10-16 NOTE — PLAN OF CARE
Assumed care of pt for the night shift. Pt alert and oriented x4. Maintaining adequate O2 saturations on RA, denies SOB, dry nonproductive cough present. Afebrile. BP stable. Pt recieved on insulin drip.  Dr. Melinda Sanderson updated on pt status, orders received to tra

## 2020-10-16 NOTE — CONSULTS
[facility]      Endocrinology Consultation    Shmuel Kevin Patient Status:  Inpatient    1970 MRN [id number]   [facility] 4SW-A Attending Jacoby Feng MD   Hosp Day # 1 PCP Jadine Bence, MD     Reason for Consultation:  New ons Rfl: , 10/14/2020 at 2100    •  HydrALAZINE HCl 100 MG Oral Tab, Take 100 mg by mouth 3 (three) times daily. , Disp: , Rfl: , 10/15/2020 at 0900    •  lisinopril (PRINIVIL,ZESTRIL) 40 MG Oral Tab, Take 40 mg by mouth daily.   , Disp: , Rfl: , 10/14/2020 at 2 Subcutaneous, Q8H [address]  •  acetaminophen (TYLENOL) tab 650 mg, 650 mg, Oral, Q6H PRN  •  ondansetron HCl (ZOFRAN) injection 4 mg, 4 mg, Intravenous, Q6H PRN  •  PEG 3350 (MIRALAX) powder packet 17 g, 17 g, Oral, Daily PRN  •  magnesium hydroxide (MILK OF MA 10/16/2020    CA 7.8 10/16/2020    ALB 2.9 10/16/2020    ALKPHO 95 10/16/2020    BILT 0.6 10/16/2020    TP 6.0 10/16/2020    AST 84 10/16/2020     10/16/2020    T4F 1.5 10/16/2020    TSH 0.326 10/16/2020     10/15/2020    DDIMER 0.29 10/16/20

## 2020-10-16 NOTE — CONSULTS
[facility]  Diabetes Consult Note    Jackelin Alberto Patient Status:  Inpatient    1970 MRN [id number]   [facility] 4SW-A Attending Hardy Pearce MD   Hosp Day # 1 PCP Jayda Cazares MD     Reason for Consult:     New onset type 2 SIMPLE POSSIBLE RADICAL ADULT N/A 7/8/2014    Performed by Fadumo Guajardo DO at [facility] MAIN OR   • OTHER  04/2018    L hip replacement   • OTHER SURGICAL HISTORY  2014    skin grafting from LE to lissette area       Allergies:   Seasonal                    Me his father in [place] and is employed by [employer] in [employer]. He has worked at home since March and only goes out for shopping, take out meals and wears a mask at all times.   He stated he was surprised by the diagnosis of diabetes and COVID due verbalized understanding and was able to teach back without incident. Recommend hospital follow-up with the [address] [place] [address] as a video visit in 2-4 days following discharge.   Also recommended follow-up diabetes education with DMG and endocrin consult  · \"Taking Insulin\" - to view prior to teaching administering a subcutaneous insulin injection    PRESCRIPTION RECOMMENDATIONS:    · Commercial -  [employer]  · Insulin:   Novolog, Fiasp, Levemir, Basaglar, Ashwin Loss  · Supplies:  BD pen needles (Maru);

## 2020-10-16 NOTE — PROGRESS NOTES
[facility]  Progress Note     Martha Tyler Patient Status:  Inpatient    1970 MRN [id number]   [facility] 4SW-A Attending Kendra Asencio MD   Hosp Day # 1 PCP Christie Villagomez MD     Chief Complaint: COVID   S:  Feels great.  No data reviewed in Epic.   Medications:   • Heparin Sodium (Porcine)  5,000 Units Subcutaneous [facility]   • atorvastatin  40 mg Oral Nightly   • Clopidogrel Bisulfate  75 mg Oral Daily   • hydrALAZINE HCl  100 mg Oral TID   • Pantoprazole Sodium  40 mg Oral Da

## 2020-10-16 NOTE — CONSULTS
INFECTIOUS DISEASE CONSULTATION    Jossue [facility] Patient Status:  Inpatient    1970 MRN [id number]   [facility] 4SW-A Attending Yulisa Avalos MD   Hosp Day # 1 PCP Iman Vital MD Father    • Lipids Father    • Diabetes Mother    • Heart Disorder Maternal Grandmother    • Heart Disorder Maternal Grandfather    • Cancer Paternal Grandmother    • Heart Disorder Paternal Grandfather       reports that he has never smoked.  He has never flextouch 30 Units, 30 Units, Subcutaneous, Nightly  No current facility-administered medications on file prior to encounter.      •  Pantoprazole Sodium 40 MG Oral Tab EC, Take 40 mg by mouth daily with breakfast., Disp: , Rfl:     •  Clopidogrel Bisulfate 10/15/20  1805 10/15/20  2147 10/16/20  0537   * 356* 156* 181*   BUN 24* 20* 19* 16   CREATSERUM 1.99* 1.46* 1.17 1.07   GFRAA 44* 64 84 94   GFRNAA 38* 56* 73 81   CA 8.6 8.0* 8.1* 7.8*   ALB 3.5  --   --  2.9*   * 131* 134* 134*   K 5.4* 4. genitalia     Physical deconditioning     Hyponatremia     Hyperkalemia     Hyperglycemia     Uncontrolled type 2 diabetes mellitus with hyperosmolar nonketotic hyperglycemia ([facility] [address]     COVID-19     WIL (acute kidney injury) ([facility] [address]        ASSESSMENT/PLAN:  1

## 2020-10-16 NOTE — PROGRESS NOTES
[facility] Pulmonary/Critical Care Progress Note     SUBJECTIVE/Interval history: All events, procedures, notes reviewed.  No acute events overnight, he feels well, hungry, looking forward to breakfast.  Insulin gtt off GFRAA 64 84 94   GFRNAA 56* 73 81   CA 8.0* 8.1* 7.8*   * 134* 134*   K 4.1 3.7 3.8    103 103   CO2 25.0 25.0 24.0     Recent Labs   Lab 10/15/20  1401 10/16/20  0537   RBC 4.67 4.03*   HGB 14.0 12.1*   HCT 41.5 35.0*   MCV 88.9 86.8   MCH 3 40 mg Oral Nightly   • Clopidogrel Bisulfate  75 mg Oral Daily   • hydrALAZINE HCl  100 mg Oral TID   • Pantoprazole Sodium  40 mg Oral Daily with breakfast   • insulin detemir  30 Units Subcutaneous Nightly     acetaminophen, ondansetron HCl, PEG 3350, ma

## 2020-10-16 NOTE — CONSULTS
[facility] Cardiology  Report of Virtual consultation  This patient is COVID-19 positive.  For purposes of responsible PPE use in this time of PPE shortage during COVID-19 pandemic and to limit possibility of further viral transmission, the fo out.  He was staying at home a lot and denied contact with sick people. He was tested positive for coronavirus antigen in the emergency room. He denies respiratory symptoms but more abdominal discomfort which could be also related to DKA.     I discussed valvular pathology. Diastolic function was normal.    Stress Test: Lexiscan nuclear stress test was stable with no new ischemia and similar to prior ones with tiny reversible anterolateral perfusion defect near the apex distally.   It was known from prior UNIT/ML flexpen 1-50 Units, 1-50 Units, Subcutaneous, TID CC and HS  •  Heparin Sodium (Porcine) 5000 UNIT/ML injection 5,000 Units, 5,000 Units, Subcutaneous, Q8H [address]  •  acetaminophen (TYLENOL) tab 650 mg, 650 mg, Oral, Q6H PRN  •  ondansetron HCl (ZOFRA rash or suspicious lesions on the skin. Musculoskeletal: No arthritis or myalgias. Gastrointestinal: Negative for any bleeding. Nonspecific abdominal discomfort. No diarrhea. Genitourinary: No hematuria.    Neurological: Alert and oriented, no headache intrahepatic ducts, and common bile duct. Common bile duct diameter is 6 mm. Negative sonographic Coronado's sign. PANCREAS:  Portions of the body and tail obscured by bowel gas limiting evaluation, however the visualized portions are grossly unremarkable. 12.4 10/16/2020    HCT 34.9 10/16/2020    .0 10/16/2020    CREATSERUM 1.07 10/16/2020    BUN 16 10/16/2020     10/16/2020    K 3.8 10/16/2020     10/16/2020    CO2 24.0 10/16/2020     10/16/2020    CA 7.8 10/16/2020    ALB 2.9 10/ initial electrolytes abnormalities related to metabolic changes and volume status abnormalities. Hyponatremia 119 and hyperkalemia in ER. Corrected with IV fluids.     Plan:  -With recovered heart pump function, normal EKG and negative troponins no need f

## 2020-10-17 ENCOUNTER — APPOINTMENT (OUTPATIENT)
Dept: CT IMAGING | Facility: HOSPITAL | Age: 50
DRG: 637 | End: 2020-10-17
Attending: HOSPITALIST
Payer: COMMERCIAL

## 2020-10-17 PROCEDURE — 74177 CT ABD & PELVIS W/CONTRAST: CPT | Performed by: HOSPITALIST

## 2020-10-17 PROCEDURE — 99233 SBSQ HOSP IP/OBS HIGH 50: CPT | Performed by: HOSPITALIST

## 2020-10-17 RX ORDER — POTASSIUM CHLORIDE 20 MEQ/1
40 TABLET, EXTENDED RELEASE ORAL ONCE
Status: COMPLETED | OUTPATIENT
Start: 2020-10-17 | End: 2020-10-17

## 2020-10-17 NOTE — PROGRESS NOTES
[facility]                INFECTIOUS DISEASE PROGRESS NOTE    Lokesh Marroquin Patient Status:  Inpatient    1970 MRN [id number]   [facility] 4SW-A Attending Ashley Cornelius MD   Hosp Day # 2 PCP MD Thu Tobin > 25.0 24.0 26.0   ALKPHO 134*  --   --  95 93   AST 70*  --   --  84* 143*   *  --   --  110* 160*   BILT 0.7  --   --  0.6 0.5   TP 7.2  --   --  6.0* 6.2*    < > = values in this interval not displayed.        VANCOMYCIN TROUGH (ug/mL)   Date Valu improved      MD [facility]  [phone]

## 2020-10-17 NOTE — PLAN OF CARE
Received pt alert, oriented, able to follow commands. On room air with diminished breath sounds. Afebrile. Blood pressure stable. Normal sinus rhythm. SCD's in place for DVT prophylaxis. No BM. Ambulating to bathroom with standby assist to void.  Pt denies

## 2020-10-17 NOTE — PROGRESS NOTES
[facility]  Progress Note     Rowena Hanna Patient Status:  Inpatient    1970 MRN [id number]   [facility] 4SW-A Attending Anabel Navarrete MD   Hosp Day # 2 PCP Ismael Tavares MD     Chief Complaint: COVID   S:  Feels fine.  No 10/15/20  1805 10/15/20  2147   TROP <0.045 <0.045   CK 87  --         Imaging: Imaging data reviewed in Epic.   Medications:   • insulin detemir  20 Units Subcutaneous Daily   • Insulin Aspart Pen  1-40 Units Subcutaneous TID CC and HS   • Insulin Aspart P

## 2020-10-17 NOTE — PROGRESS NOTES
[facility]  Progress Note    Nati Morales Patient Status:  Inpatient    1970 MRN [id number]   [facility] 4SW-A Attending Dru Angel MD   Hosp Day # 2 PCP Benitez Chapman MD     Subjective:  Nati Morales is a(n) 53year old mal 26.0   BUN 24* 20* 19* 16 13   CREATSERUM 1.99* 1.46* 1.17 1.07 1.04     Recent Labs   Lab 10/17/20  0441   PTP 13.6   INR 1.01   PTT 41.9*       Cultures: BC remain neg     Radiology:  No results found.   Chest x-ray remains clear      Medications reviewed MD  10/17/2020  11:12 AM

## 2020-10-17 NOTE — PLAN OF CARE
Assumed care of patient at change of shift. Pt aox4. Afebrile, SR on tele, hemodynamically stable, room air. Diminished lung sounds. Encouraged use of IS. scds in place. Up to bathroom independent after set up.  Bm. Voiding per brp or urinal. [facility]

## 2020-10-17 NOTE — DIETARY NOTE
[address] [facility]     Admitting diagnosis:  Uncontrolled type 2 diabetes mellitus with hyperosmolar nonketotic hyperglycemia ([facility] [address] [E11.00]    Ht: 172.7 cm (5' 8\")  Wt: 90.9 kg (200 lb 6.4 oz).  This is 130 % of IBW  Bod

## 2020-10-17 NOTE — PROGRESS NOTES
[facility]  Endocrinology Progress Note    Margarita Garcia Patient Status:  Inpatient    1970 MRN [id number]   [facility] 4SW-A Attending Freddie Whitfield MD   Hosp Day # 2 PCP Trevon Figueroa MD     Subjective:  Feels fine overall.  Rec 148* 187* 196* 184* 301* 235* 298* 172*      Ref. Range 10/15/2020 14:01   HEMOGLOBIN A1c Latest Ref Range: <5.7 % 13.6 (H)      Ref.  Range 3/13/2014 18:44 7/31/2018 00:00 10/16/2020 05:37   T4,Free (Direct) Latest Ref Range: 0.8 - 1.7 ng/dL 1.3  1.5   TSH

## 2020-10-18 VITALS
OXYGEN SATURATION: 95 % | WEIGHT: 198.63 LBS | TEMPERATURE: 98 F | HEART RATE: 102 BPM | BODY MASS INDEX: 30.1 KG/M2 | HEIGHT: 68 IN | DIASTOLIC BLOOD PRESSURE: 87 MMHG | SYSTOLIC BLOOD PRESSURE: 134 MMHG | RESPIRATION RATE: 20 BRPM

## 2020-10-18 PROCEDURE — 99239 HOSP IP/OBS DSCHRG MGMT >30: CPT | Performed by: HOSPITALIST

## 2020-10-18 RX ORDER — PEN NEEDLE, DIABETIC 30 GX3/16"
1 NEEDLE, DISPOSABLE MISCELLANEOUS
Qty: 200 EACH | Refills: 1 | Status: SHIPPED | OUTPATIENT
Start: 2020-10-18 | End: 2021-01-05

## 2020-10-18 NOTE — PLAN OF CARE
Pt a/o x4. Resting comfortably in bed. OOB to bathroom with no issues. bm x1. Eating meals, although says he can only eat certain things, some foods not appealing to him yet.      Have done some education re: new dx diabetes; pt receptive, but feels overwhe

## 2020-10-18 NOTE — PLAN OF CARE
Assumed care of pt for the night shift. Pt alert and oriented x4. Maintaining adequate O2 saturations on RA. Dry, nonproductive cough present, no complaints of shortness of breath/difficulty breathing. No complaints of pain. Pt resting comfortably in bed.

## 2020-10-18 NOTE — PROGRESS NOTES
[facility]  Endocrinology Progress Note    Roberto Madera Patient Status:  Inpatient    1970 MRN [id number]   [facility] 4SW-A Attending Jonny Alvarenga MD   Hosp Day # 3 PCP Purnima Dunn MD     Subjective:  Does not feel well toda 10/16/2020 05:37   T4,Free (Direct) Latest Ref Range: 0.8 - 1.7 ng/dL 1.3  1.5   TSH Latest Ref Range: 0.358 - 3.740 mIU/mL 1.160 0.60 0.326 (L)   T3 FREE Latest Ref Range: 2.40 - 4.20 pg/mL   2.18 (L)     Impression and Plan:  1.  Uncontrolled DM - new ons

## 2020-10-18 NOTE — PROGRESS NOTES
[facility]  Progress Note    Deidra Cage Patient Status:  Inpatient    1970 MRN [id number]   [facility] 4SW-A Attending Manjit Zambrano MD   Hosp Day # 3 PCP Violetta Lopez MD     Subjective:  Deidra Cage is a(n) 53year old mal Recent Labs   Lab 10/17/20  0441   PTP 13.6   INR 1.01   PTT 41.9*       Cultures: Reviewed    Radiology:  Ct Abdomen+pelvis(contrast Only)(cpt=74177)    Result Date: 10/17/2020  CONCLUSION:  1.  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

## 2020-10-18 NOTE — DISCHARGE SUMMARY
[facility] HOSPITALIST  DISCHARGE SUMMARY     Tessa Koch Patient Status:  Inpatient    1970 MRN [id number]   [facility] 4SW-A Attending Bryant Bay MD   Hosp Day # 3 PCP Candelario Calhoun MD     Date of Admission: 10/15/2020  Date of Dis than 13. Endocrinology and ICU were following throughout the hospital course. Patient was educated on aggressive insulin regimen. Patient did have elevated LFTs with unimpressive ultrasound.   Patient underwent CT of the abdomen to rule out any underlyin details   atorvastatin 40 MG Tabs  Commonly known as: LIPITOR      Take 40 mg by mouth nightly.    Refills: 0     Clopidogrel Bisulfate 75 MG Tabs  Commonly known as: PLAVIX      take one tablet by mouth daily   Refills: 0     Coreg 25 MG Tabs  Generic drug spent:  > 30 minutes

## 2020-10-19 NOTE — PAYOR COMM NOTE
--------------  DISCHARGE REVIEW    Payor: MARY [address] #:  [id number]  Authorization Number: [id number]    Admit date: 10/15/20  Admit time:  1626  Discharge Date: 10/18/2020  4:43 PM     Admitting Physician: DO Arnold Diaz follow up with endocrine    Lace+ Score: 55  59-90 High Risk  29-58 Medium Risk  0-28   Low Risk.      Risk of readmission: Chanel Perez has High Risk of readmission after discharge from the hospital.    History of Present Illness: Chanel Perez is a 53 ye Does not apply route 4 (four) times daily before meals and nightly. Quantity: 1 kit  Refills: 0     Insulin Aspart Pen 100 UNIT/ML Sopn  Commonly known as: NOVOLOG      Inject 20-30 Units into the skin 3 (three) times daily before meals.  Ok to substitute directed by your doctor or nurse    Bring a paper prescription for each of these medications  · Accu-Chek FastClix Lancets Misc  · Accu-Chek Guide Strp  · Accu-Chek Guide w/Device Kit         Follow-up appointment:   Estella Rodney MD  [address] [place] [address] TAN VILLALBA

## 2020-10-20 ENCOUNTER — PATIENT OUTREACH (OUTPATIENT)
Dept: CASE MANAGEMENT | Age: 50
End: 2020-10-20

## 2020-10-20 ENCOUNTER — TELEPHONE (OUTPATIENT)
Dept: FAMILY MEDICINE CLINIC | Facility: CLINIC | Age: 50
End: 2020-10-20

## 2020-10-20 DIAGNOSIS — E11.00 UNCONTROLLED TYPE 2 DIABETES MELLITUS WITH HYPEROSMOLAR NONKETOTIC HYPERGLYCEMIA (HCC): ICD-10-CM

## 2020-10-20 DIAGNOSIS — Z02.9 ENCOUNTERS FOR UNSPECIFIED ADMINISTRATIVE PURPOSE: ICD-10-CM

## 2020-10-20 PROCEDURE — 1111F DSCHRG MED/CURRENT MED MERGE: CPT

## 2020-10-20 NOTE — PROGRESS NOTES
[facility] contact information provided as well as [facility] office number, [phone].

## 2020-10-20 NOTE — PROGRESS NOTES
Initial Post Discharge Follow Up   Discharge Date: 10/18/20  Contact Date: 10/20/2020    Consent Verification:  Assessment Completed With: Patient  HIPAA Verified?   Yes    Discharge Dx:  Malaise, weakness- multifactorial due to coronavirus, hyperglycemi 20-30 Units into the skin 3 (three) times daily before meals.  Ok to substitute Humalog if needed 30 mL 1   • Pantoprazole Sodium 40 MG Oral Tab EC Take 40 mg by mouth daily with breakfast.     • Clopidogrel Bisulfate 75 MG Oral Tab take one tablet by mouth No    Referrals/orders at D/C:  Home Health/Services ordered at D/C? No  DME ordered at D/C? No    Needs post D/C:   Now that you are home, are there any needs or concerns you need addressed before your next visit with your PCP?  (DME, meds, disease concer reviewed and discussed. Any changes or updates to medications and or orders sent to PCP.

## 2020-10-20 NOTE — TELEPHONE ENCOUNTER
Re:  has had a  Gag reflux for the last 2 weeks when eating solid foods. is this releated to COVID or do you think its something else.   Please advise

## 2020-10-20 NOTE — TELEPHONE ENCOUNTER
Pt scheduled virtual appt with Dr. Pete Paiz on Thursday. Pt does not have smart phone. Pt consented to phone visit. Eric Watson  Verbally consents to a Virtual/Telephone Check-In service on 10/20/2020.     Patient understands and accepts financial respon

## 2020-10-20 NOTE — TELEPHONE ENCOUNTER
Pt discharged 10-18-20, uncontrolled type 2 diabetes, COVID-19. Pt does not have HFU appt scheduled at this time and would need virtual visit since he is COVID+.   NCM offered to schedule for pt however he declined stating he wanted to wait until he receiv

## 2020-10-22 ENCOUNTER — VIRTUAL PHONE E/M (OUTPATIENT)
Dept: FAMILY MEDICINE CLINIC | Facility: CLINIC | Age: 50
End: 2020-10-22
Payer: COMMERCIAL

## 2020-10-22 VITALS
WEIGHT: 195 LBS | BODY MASS INDEX: 29.55 KG/M2 | HEIGHT: 68 IN | DIASTOLIC BLOOD PRESSURE: 63 MMHG | TEMPERATURE: 98 F | SYSTOLIC BLOOD PRESSURE: 95 MMHG

## 2020-10-22 DIAGNOSIS — I10 ESSENTIAL HYPERTENSION: ICD-10-CM

## 2020-10-22 DIAGNOSIS — E11.00 UNCONTROLLED TYPE 2 DIABETES MELLITUS WITH HYPEROSMOLAR NONKETOTIC HYPERGLYCEMIA (HCC): ICD-10-CM

## 2020-10-22 DIAGNOSIS — R94.6 ABNORMAL THYROID FUNCTION TEST: ICD-10-CM

## 2020-10-22 DIAGNOSIS — U07.1 COVID-19: Primary | ICD-10-CM

## 2020-10-22 DIAGNOSIS — R13.11 ORAL PHASE DYSPHAGIA: ICD-10-CM

## 2020-10-22 DIAGNOSIS — R79.89 ELEVATED LFTS: ICD-10-CM

## 2020-10-22 PROBLEM — E78.5 HYPERLIPIDEMIA: Status: ACTIVE | Noted: 2020-10-22

## 2020-10-22 PROBLEM — I47.2 NSVT (NONSUSTAINED VENTRICULAR TACHYCARDIA) (HCC): Status: ACTIVE | Noted: 2020-10-22

## 2020-10-22 PROBLEM — I47.29 NSVT (NONSUSTAINED VENTRICULAR TACHYCARDIA): Status: ACTIVE | Noted: 2020-10-22

## 2020-10-22 PROBLEM — I47.29 NSVT (NONSUSTAINED VENTRICULAR TACHYCARDIA) (HCC): Status: ACTIVE | Noted: 2020-10-22

## 2020-10-22 PROCEDURE — 3074F SYST BP LT 130 MM HG: CPT | Performed by: FAMILY MEDICINE

## 2020-10-22 PROCEDURE — 3078F DIAST BP <80 MM HG: CPT | Performed by: FAMILY MEDICINE

## 2020-10-22 PROCEDURE — 3008F BODY MASS INDEX DOCD: CPT | Performed by: FAMILY MEDICINE

## 2020-10-22 PROCEDURE — 99443 PHONE E/M BY PHYS 21-30 MIN: CPT | Performed by: FAMILY MEDICINE

## 2020-10-22 NOTE — PROGRESS NOTES
[facility] [place]  PROGRESS NOTE        HPI:   This is a 53year old male coming in for Patient presents with:  Hospital F/U  Other: Patient states that he is having gag reflexes with eating solid foods.      HPI  He was hospitalized at [facility] Urine Negative Negative    pH Urine 6.0 4.5 - 8.0    Protein Urine Negative Negative mg/dl    Urobilinogen Urine <2.0 0.2 - 2.0 mg/dL    Nitrite Urine Negative Negative    Leukocyte Esterase Urine Negative Negative    Microscopic Microscopic not indicated HEMOGLOBIN A1C   Result Value Ref Range    HgbA1C 13.6 (H) <5.7 %    Estimated Average Glucose 344 (H) 68 - 126 mg/dL   BASIC METABOLIC PANEL (8)   Result Value Ref Range    Glucose 156 (H) 70 - 99 mg/dL    Sodium 134 (L) 136 - 145 mmol/L    Potassium 3. Result Value Ref Range    T3 Free 2.18 (L) 2.40 - 4.20 pg/mL   CBC, PLATELET; NO DIFFERENTIAL   Result Value Ref Range    WBC 4.4 4.0 - 11.0 x10(3) uL    RBC 3.99 (L) 4.30 - 5.70 x10(6)uL    HGB 12.4 (L) 13.0 - 17.5 g/dL    HCT 34.9 (L) 39.0 - 53.0 % Range    Glucose 99 70 - 99 mg/dL    Sodium 136 136 - 145 mmol/L    Potassium 3.9 3.5 - 5.1 mmol/L    Chloride 105 98 - 112 mmol/L    CO2 24.0 21.0 - 32.0 mmol/L    Anion Gap 7 0 - 18 mmol/L    BUN 9 7 - 18 mg/dL    Creatinine 0.98 0.70 - 1.30 mg/dL    BUN - 99 mg/dL   POCT GLUCOSE   Result Value Ref Range    POC Glucose 184 (H) 70 - 99 mg/dL   POCT GLUCOSE   Result Value Ref Range    POC Glucose 301 (H) 70 - 99 mg/dL   POCT GLUCOSE   Result Value Ref Range    POC Glucose 235 (H) 70 - 99 mg/dL   POCT GLUCOSE Monocyte Absolute 0.68 0.10 - 1.00 x10(3) uL    Eosinophil Absolute 0.02 0.00 - 0.70 x10(3) uL    Basophil Absolute 0.02 0.00 - 0.20 x10(3) uL    Immature Granulocyte Absolute 0.03 0.00 - 1.00 x10(3) uL    Neutrophil % 72.2 %    Lymphocyte % 17.9 %    M 57.2 %    Lymphocyte % 32.6 %    Monocyte % 9.1 %    Eosinophil % 0.7 %    Basophil % 0.2 %    Immature Granulocyte % 0.2 %   CBC W/ DIFFERENTIAL   Result Value Ref Range    WBC 4.2 4.0 - 11.0 x10(3) uL    RBC 4.25 (L) 4.30 - 5.70 x10(6)uL    HGB 12.9 (L) History Narrative      Works in [employer] for [employer]. Non smoking. Lives at home: with his father.      Social History    Tobacco Use      Smoking status: Never Smoker      Smokeless tobacco: Never Used    Alcohol use: No    Drug 25 MG Oral Tab Take 25 mg by mouth 2 (two) times daily. • atorvastatin 40 MG Oral Tab Take 40 mg by mouth nightly. • HydrALAZINE HCl 100 MG Oral Tab Take 100 mg by mouth 3 (three) times daily.      • lisinopril (PRINIVIL,ZESTRIL) 40 MG Oral Tab (92.5 kg)  05/06/19 : 206 lb 3.2 oz (93.5 kg)     BP Readings from Last 3 Encounters:  10/22/20 : 95/63  10/18/20 : 134/87  10/15/20 : 96/64   Alert and Orientated x 3. Apparent clear sensorium. No pressured speech. normal thought content.    NO evidence health crisis/national emergency and because of restrictions of visitation. There are limitations of this visit as an adequate physical exam could not be performed. Every conscious effort was taken to allow for sufficient and adequate time.   This billing

## 2020-10-28 ENCOUNTER — TELEPHONE (OUTPATIENT)
Dept: FAMILY MEDICINE CLINIC | Facility: CLINIC | Age: 50
End: 2020-10-28

## 2020-10-28 NOTE — TELEPHONE ENCOUNTER
while in the hospital he found out that he is diabetic, they gave him an endocrinologist, he wants to change to dr davies to handle/manage, please advise

## 2020-10-29 NOTE — TELEPHONE ENCOUNTER
Spoke with patient .  He wants to schedule a diabetic exam first then a physical.  Future Appointments   Date Time Provider Shahnaz Logan   11/17/2020 10:30 AM Beatriz Kaufman MD [facility]

## 2020-10-29 NOTE — TELEPHONE ENCOUNTER
Please advise. PCP listed -Dr Jerson Puga. Patient has not had a physical recently with PCP. Has only seen for sleep.

## 2020-10-29 NOTE — TELEPHONE ENCOUNTER
Schedule physical and we can see where his diabetes and treatment are and can move forward from there. Will need visit every 3 months.

## 2020-11-10 ENCOUNTER — TELEPHONE (OUTPATIENT)
Dept: FAMILY MEDICINE CLINIC | Facility: CLINIC | Age: 50
End: 2020-11-10

## 2020-11-10 DIAGNOSIS — E11.00 UNCONTROLLED TYPE 2 DIABETES MELLITUS WITH HYPEROSMOLAR NONKETOTIC HYPERGLYCEMIA (HCC): Primary | ICD-10-CM

## 2020-11-10 DIAGNOSIS — E78.2 MIXED HYPERLIPIDEMIA: ICD-10-CM

## 2020-11-10 NOTE — TELEPHONE ENCOUNTER
had BW on 11/7   at  [facility]      wondering if  has received these results   /     please advise    Future Appointments   Date Time Provider Shahnaz Logan   11/17/2020 10:30 AM Enrrique Toledo MD [facility] [place]

## 2020-11-11 ENCOUNTER — LABORATORY ENCOUNTER (OUTPATIENT)
Dept: LAB | Age: 50
End: 2020-11-11
Attending: FAMILY MEDICINE
Payer: COMMERCIAL

## 2020-11-11 DIAGNOSIS — E11.00 UNCONTROLLED TYPE 2 DIABETES MELLITUS WITH HYPEROSMOLAR NONKETOTIC HYPERGLYCEMIA (HCC): ICD-10-CM

## 2020-11-11 DIAGNOSIS — R53.83 FATIGUE, UNSPECIFIED TYPE: ICD-10-CM

## 2020-11-11 DIAGNOSIS — R79.89 ELEVATED LFTS: ICD-10-CM

## 2020-11-11 DIAGNOSIS — R73.9 HYPERGLYCEMIA: ICD-10-CM

## 2020-11-11 PROCEDURE — 80061 LIPID PANEL: CPT | Performed by: NURSE PRACTITIONER

## 2020-11-11 PROCEDURE — 83690 ASSAY OF LIPASE: CPT | Performed by: NURSE PRACTITIONER

## 2020-11-11 PROCEDURE — 82728 ASSAY OF FERRITIN: CPT | Performed by: NURSE PRACTITIONER

## 2020-11-11 PROCEDURE — 84550 ASSAY OF BLOOD/URIC ACID: CPT | Performed by: NURSE PRACTITIONER

## 2020-11-11 PROCEDURE — 84481 FREE ASSAY (FT-3): CPT | Performed by: NURSE PRACTITIONER

## 2020-11-11 PROCEDURE — 84681 ASSAY OF C-PEPTIDE: CPT

## 2020-11-11 PROCEDURE — 83036 HEMOGLOBIN GLYCOSYLATED A1C: CPT | Performed by: FAMILY MEDICINE

## 2020-11-11 PROCEDURE — 83550 IRON BINDING TEST: CPT | Performed by: NURSE PRACTITIONER

## 2020-11-11 PROCEDURE — 36415 COLL VENOUS BLD VENIPUNCTURE: CPT

## 2020-11-11 PROCEDURE — 81003 URINALYSIS AUTO W/O SCOPE: CPT | Performed by: FAMILY MEDICINE

## 2020-11-11 PROCEDURE — 80053 COMPREHEN METABOLIC PANEL: CPT | Performed by: NURSE PRACTITIONER

## 2020-11-11 PROCEDURE — 84443 ASSAY THYROID STIM HORMONE: CPT

## 2020-11-11 PROCEDURE — 82607 VITAMIN B-12: CPT | Performed by: NURSE PRACTITIONER

## 2020-11-11 PROCEDURE — 82306 VITAMIN D 25 HYDROXY: CPT | Performed by: FAMILY MEDICINE

## 2020-11-11 PROCEDURE — 84439 ASSAY OF FREE THYROXINE: CPT

## 2020-11-11 PROCEDURE — 82150 ASSAY OF AMYLASE: CPT | Performed by: NURSE PRACTITIONER

## 2020-11-11 PROCEDURE — 85025 COMPLETE CBC W/AUTO DIFF WBC: CPT | Performed by: FAMILY MEDICINE

## 2020-11-11 PROCEDURE — 83540 ASSAY OF IRON: CPT | Performed by: NURSE PRACTITIONER

## 2020-11-12 ENCOUNTER — TELEPHONE (OUTPATIENT)
Dept: FAMILY MEDICINE CLINIC | Facility: CLINIC | Age: 50
End: 2020-11-12

## 2020-11-12 NOTE — TELEPHONE ENCOUNTER
----- Message from Elmer Sousa MD sent at 11/12/2020  8:44 AM CST -----  Your thyroid is recovering great from your stress. Continue present management for diabetes and other diseases.    See you Tuesday     Your Appointments    Tuesday November 17, 202

## 2020-11-13 ENCOUNTER — TELEPHONE (OUTPATIENT)
Dept: FAMILY MEDICINE CLINIC | Facility: CLINIC | Age: 50
End: 2020-11-13

## 2020-11-13 NOTE — TELEPHONE ENCOUNTER
----- Message from Rupinder eDl Toro sent at 11/13/2020  2:13 PM CST -----  Returned call- [phone]

## 2020-11-13 NOTE — TELEPHONE ENCOUNTER
----- Message from JULISSA Robles sent at 11/13/2020  1:12 PM CST -----  Patient recently had labs drawn for visit with Dr. Osmel Greco. These labs may have been drawn from visit back before the patient went to the hospital as I didn't order future labs.

## 2020-11-16 ENCOUNTER — TELEPHONE (OUTPATIENT)
Dept: FAMILY MEDICINE CLINIC | Facility: CLINIC | Age: 50
End: 2020-11-16

## 2020-11-16 RX ORDER — EPLERENONE 25 MG/1
TABLET, FILM COATED ORAL
Qty: 180 TABLET | Refills: 3 | Status: SHIPPED | OUTPATIENT
Start: 2020-11-16

## 2020-11-16 RX ORDER — LISINOPRIL 40 MG/1
TABLET ORAL
Qty: 90 TABLET | Refills: 3 | Status: SHIPPED | OUTPATIENT
Start: 2020-11-16

## 2020-11-16 NOTE — TELEPHONE ENCOUNTER
----- Message from Kinsey Benavides MD sent at 11/16/2020  9:48 AM CST -----  Thyroid looks good.    Continue present managment

## 2020-11-16 NOTE — TELEPHONE ENCOUNTER
See previous telephone encounters as well as mychart. Patient has already been informed of his lab results.

## 2020-11-17 ENCOUNTER — OFFICE VISIT (OUTPATIENT)
Dept: FAMILY MEDICINE CLINIC | Facility: CLINIC | Age: 50
End: 2020-11-17
Payer: COMMERCIAL

## 2020-11-17 ENCOUNTER — TELEPHONE (OUTPATIENT)
Dept: FAMILY MEDICINE CLINIC | Facility: CLINIC | Age: 50
End: 2020-11-17

## 2020-11-17 VITALS
HEIGHT: 68 IN | SYSTOLIC BLOOD PRESSURE: 124 MMHG | BODY MASS INDEX: 30.31 KG/M2 | TEMPERATURE: 98 F | OXYGEN SATURATION: 98 % | RESPIRATION RATE: 14 BRPM | DIASTOLIC BLOOD PRESSURE: 66 MMHG | WEIGHT: 200 LBS | HEART RATE: 79 BPM

## 2020-11-17 DIAGNOSIS — N17.9 AKI (ACUTE KIDNEY INJURY) (HCC): ICD-10-CM

## 2020-11-17 DIAGNOSIS — E11.00 UNCONTROLLED TYPE 2 DIABETES MELLITUS WITH HYPEROSMOLAR NONKETOTIC HYPERGLYCEMIA (HCC): ICD-10-CM

## 2020-11-17 DIAGNOSIS — R79.89 ELEVATED LFTS: Primary | ICD-10-CM

## 2020-11-17 PROBLEM — M25.559 HIP PAIN: Status: RESOLVED | Noted: 2018-04-23 | Resolved: 2020-11-17

## 2020-11-17 PROBLEM — R73.9 HYPERGLYCEMIA: Status: RESOLVED | Noted: 2020-10-15 | Resolved: 2020-11-17

## 2020-11-17 PROBLEM — E87.1 HYPONATREMIA: Status: RESOLVED | Noted: 2020-10-15 | Resolved: 2020-11-17

## 2020-11-17 PROBLEM — E87.5 HYPERKALEMIA: Status: RESOLVED | Noted: 2020-10-15 | Resolved: 2020-11-17

## 2020-11-17 PROCEDURE — 3008F BODY MASS INDEX DOCD: CPT | Performed by: FAMILY MEDICINE

## 2020-11-17 PROCEDURE — 3074F SYST BP LT 130 MM HG: CPT | Performed by: FAMILY MEDICINE

## 2020-11-17 PROCEDURE — 3078F DIAST BP <80 MM HG: CPT | Performed by: FAMILY MEDICINE

## 2020-11-17 PROCEDURE — 99214 OFFICE O/P EST MOD 30 MIN: CPT | Performed by: FAMILY MEDICINE

## 2020-11-17 RX ORDER — FLASH GLUCOSE SENSOR
1 KIT MISCELLANEOUS 4 TIMES DAILY
Qty: 3 EACH | Refills: 11 | Status: SHIPPED | OUTPATIENT
Start: 2020-11-17

## 2020-11-17 RX ORDER — FLASH GLUCOSE SENSOR
1 KIT MISCELLANEOUS 4 TIMES DAILY
Qty: 3 EACH | Refills: 11 | Status: SHIPPED | OUTPATIENT
Start: 2020-11-17 | End: 2020-11-19

## 2020-11-17 RX ORDER — BLOOD SUGAR DIAGNOSTIC
STRIP MISCELLANEOUS
Qty: 400 STRIP | Refills: 1 | Status: SHIPPED | OUTPATIENT
Start: 2020-11-17

## 2020-11-17 NOTE — TELEPHONE ENCOUNTER
Pt has refill request for Novolog 100 Unit/mL flexpen(last filled 10/18/20), Levemir flex touch 100 units(last filled 10/18/20) and accu-chek lancets(10/21/20). Was seen today(11/17/20) for diabetes. Last labs done 11/11/20. No future appointments.

## 2020-11-17 NOTE — TELEPHONE ENCOUNTER
Patient wanted to know whether he should continue the 20-30 units of Aspart Pen insulin prior to meals, or if he should decrease this amount. See also refill request from 11/17/20.

## 2020-11-17 NOTE — PROGRESS NOTES
[facility] [place]  PROGRESS NOTE        HPI:   This is a 53year old male coming in for Patient presents with:  Diabetes    HPI  Patient was recently admitted with diabetes, covid 23 and worsening cardiovascular disease.    He is overall feeling transfusion 2014   • Sleep apnea     diagnosed but no followup   • Visual impairment      Past Surgical History:   Procedure Laterality Date   • ANGIOGRAM     • ANGIOPLASTY (CORONARY)      with 6 stents   • ESOPHAGOGASTRODUODENOSCOPY (EGD) N/A 7/14/2014 400 each 1   • Insulin Pen Needle (PEN NEEDLES) 32G X 4 MM Does not apply Misc 1 each by Does not apply route 5 (five) times daily.  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 Pulmonary/Chest: Effort normal and breath sounds normal.   Abdominal: Soft. Bowel sounds are normal.   Musculoskeletal: Normal range of motion. General: No edema.    Neurologic each 11     Si each by Does not apply route 4 (four) times daily. Outcome: Parent verbalizes understanding. Parent is notified to call with any questions, complications, allergies, or worsening or changing symptoms.   Parent is to call with any si

## 2020-11-17 NOTE — PATIENT INSTRUCTIONS
Vitamin D is sub therapeutic. Recommend 2000 units of vitamin D daily, increase if already taking. Goal of 52 for vitamin D   Recheck vitamin D level in 6  months. [employer]. net  (0528 access number) for vitamins      Check sugars before and aft

## 2020-11-17 NOTE — TELEPHONE ENCOUNTER
Discussed will need to get [facility] reader. Continue to use insulin before meals.

## 2020-11-18 NOTE — TELEPHONE ENCOUNTER
[facility] Sensor is supposed to be changed every 14 days not four times daily. Please resent rx with appropriate directions.     Pended RX for Ayden Caballero

## 2020-11-19 ENCOUNTER — TELEPHONE (OUTPATIENT)
Dept: FAMILY MEDICINE CLINIC | Facility: CLINIC | Age: 50
End: 2020-11-19

## 2020-11-19 RX ORDER — FLASH GLUCOSE SENSOR
1 KIT MISCELLANEOUS 4 TIMES DAILY
Qty: 3 EACH | Refills: 11 | Status: SHIPPED | OUTPATIENT
Start: 2020-11-19

## 2020-11-19 RX ORDER — HYDRALAZINE HYDROCHLORIDE 100 MG/1
TABLET, FILM COATED ORAL
Qty: 270 TABLET | Refills: 1 | Status: SHIPPED | OUTPATIENT
Start: 2020-11-19

## 2020-11-19 NOTE — TELEPHONE ENCOUNTER
RE:  new RX for freeStyle Leslie  ( [facility] out of stock for a few days )   they need RX for reader out-put  [facility] [address]           No future appointments.

## 2020-11-19 NOTE — TELEPHONE ENCOUNTER
Cancel the request for Maribel Cunningham because it is not covered by insurance. Would like to change to Dexcom. Pharmacy believes it is covered by his insurance.

## 2020-11-20 RX ORDER — BLOOD-GLUCOSE TRANSMITTER
1 EACH MISCELLANEOUS
Qty: 1 EACH | Refills: 3 | Status: SHIPPED | OUTPATIENT
Start: 2020-11-20 | End: 2021-10-22

## 2020-11-20 RX ORDER — BLOOD-GLUCOSE,RECEIVER,CONT
1 EACH MISCELLANEOUS ONCE
Qty: 1 DEVICE | Refills: 0 | Status: SHIPPED | OUTPATIENT
Start: 2020-11-20 | End: 2020-11-20

## 2020-11-20 RX ORDER — BLOOD-GLUCOSE SENSOR
1 EACH MISCELLANEOUS
Qty: 9 EACH | Refills: 3 | Status: SHIPPED | OUTPATIENT
Start: 2020-11-20 | End: 2021-10-21

## 2021-01-05 RX ORDER — PEN NEEDLE, DIABETIC 32GX 5/32"
NEEDLE, DISPOSABLE MISCELLANEOUS
Qty: 200 EACH | Refills: 1 | Status: SHIPPED | OUTPATIENT
Start: 2021-01-05 | End: 2021-01-07

## 2021-01-05 NOTE — TELEPHONE ENCOUNTER
Future appt:     Your appointments     Date & Time Appointment Department [facility])    Jan 12, 2021  9:50 AM CST Follow up - Extended with Kel Nj MD [facility] [place]            [address] [place] [address]

## 2021-01-07 ENCOUNTER — TELEPHONE (OUTPATIENT)
Dept: FAMILY MEDICINE CLINIC | Facility: CLINIC | Age: 51
End: 2021-01-07

## 2021-01-07 RX ORDER — PEN NEEDLE, DIABETIC 32GX 5/32"
NEEDLE, DISPOSABLE MISCELLANEOUS
Qty: 200 EACH | Refills: 1 | Status: SHIPPED | OUTPATIENT
Start: 2021-01-07 | End: 2021-03-11

## 2021-01-07 NOTE — TELEPHONE ENCOUNTER
Please resend the BD pen tips prescription with revised instructions stating tips are used 5x/day. Gave verbal ok to pharmacy on 1/5 and they are still calling requesting clarification on 1/7.

## 2021-01-07 NOTE — TELEPHONE ENCOUNTER
BB Maru pen needles     ( needs more specific directions )    past orders was 5x daily  ( current rx stated as needed )      verbal directions accepted       Future Appointments   Date Time Provider Shahnaz Logan   1/12/2021  9:50 Lisa Jurado MD

## 2021-01-12 ENCOUNTER — OFFICE VISIT (OUTPATIENT)
Dept: FAMILY MEDICINE CLINIC | Facility: CLINIC | Age: 51
End: 2021-01-12
Payer: COMMERCIAL

## 2021-01-12 ENCOUNTER — LAB ENCOUNTER (OUTPATIENT)
Dept: LAB | Age: 51
End: 2021-01-12
Attending: FAMILY MEDICINE
Payer: COMMERCIAL

## 2021-01-12 VITALS
DIASTOLIC BLOOD PRESSURE: 78 MMHG | OXYGEN SATURATION: 98 % | RESPIRATION RATE: 16 BRPM | SYSTOLIC BLOOD PRESSURE: 120 MMHG | TEMPERATURE: 97 F | HEART RATE: 66 BPM | BODY MASS INDEX: 30.82 KG/M2 | HEIGHT: 68 IN | WEIGHT: 203.38 LBS

## 2021-01-12 DIAGNOSIS — E11.00 UNCONTROLLED TYPE 2 DIABETES MELLITUS WITH HYPEROSMOLAR NONKETOTIC HYPERGLYCEMIA (HCC): Primary | ICD-10-CM

## 2021-01-12 DIAGNOSIS — I51.4 MYOCARDITIS, UNSPECIFIED CHRONICITY, UNSPECIFIED MYOCARDITIS TYPE (HCC): ICD-10-CM

## 2021-01-12 DIAGNOSIS — E78.2 MIXED HYPERLIPIDEMIA: ICD-10-CM

## 2021-01-12 DIAGNOSIS — D64.9 ANEMIA, UNSPECIFIED TYPE: ICD-10-CM

## 2021-01-12 LAB
ALBUMIN SERPL-MCNC: 3.9 G/DL (ref 3.4–5)
ALBUMIN/GLOB SERPL: 1 {RATIO} (ref 1–2)
ALP LIVER SERPL-CCNC: 102 U/L
ALT SERPL-CCNC: 57 U/L
ANION GAP SERPL CALC-SCNC: 2 MMOL/L (ref 0–18)
AST SERPL-CCNC: 31 U/L (ref 15–37)
BILIRUB SERPL-MCNC: 0.5 MG/DL (ref 0.1–2)
BUN BLD-MCNC: 17 MG/DL (ref 7–18)
BUN/CREAT SERPL: 13.6 (ref 10–20)
CALCIUM BLD-MCNC: 9.4 MG/DL (ref 8.5–10.1)
CHLORIDE SERPL-SCNC: 107 MMOL/L (ref 98–112)
CO2 SERPL-SCNC: 31 MMOL/L (ref 21–32)
CREAT BLD-MCNC: 1.25 MG/DL
EST. AVERAGE GLUCOSE BLD GHB EST-MCNC: 123 MG/DL (ref 68–126)
GLOBULIN PLAS-MCNC: 3.8 G/DL (ref 2.8–4.4)
GLUCOSE BLD-MCNC: 123 MG/DL (ref 70–99)
HBA1C MFR BLD HPLC: 5.9 % (ref ?–5.7)
M PROTEIN MFR SERPL ELPH: 7.7 G/DL (ref 6.4–8.2)
OSMOLALITY SERPL CALC.SUM OF ELEC: 293 MOSM/KG (ref 275–295)
PATIENT FASTING Y/N/NP: NO
POTASSIUM SERPL-SCNC: 4.8 MMOL/L (ref 3.5–5.1)
SODIUM SERPL-SCNC: 140 MMOL/L (ref 136–145)

## 2021-01-12 PROCEDURE — 3008F BODY MASS INDEX DOCD: CPT | Performed by: FAMILY MEDICINE

## 2021-01-12 PROCEDURE — 83036 HEMOGLOBIN GLYCOSYLATED A1C: CPT | Performed by: FAMILY MEDICINE

## 2021-01-12 PROCEDURE — 3078F DIAST BP <80 MM HG: CPT | Performed by: FAMILY MEDICINE

## 2021-01-12 PROCEDURE — 99214 OFFICE O/P EST MOD 30 MIN: CPT | Performed by: FAMILY MEDICINE

## 2021-01-12 PROCEDURE — 36415 COLL VENOUS BLD VENIPUNCTURE: CPT | Performed by: FAMILY MEDICINE

## 2021-01-12 PROCEDURE — 3074F SYST BP LT 130 MM HG: CPT | Performed by: FAMILY MEDICINE

## 2021-01-12 PROCEDURE — 80053 COMPREHEN METABOLIC PANEL: CPT | Performed by: FAMILY MEDICINE

## 2021-01-12 RX ORDER — BLOOD-GLUCOSE,RECEIVER,CONT
EACH MISCELLANEOUS
COMMUNITY
Start: 2020-11-20 | End: 2021-10-21

## 2021-01-12 NOTE — PROGRESS NOTES
[facility] [place]  PROGRESS NOTE        HPI:   This is a 53year old male coming in for Patient presents with: Follow - Up: 1 month F/U    HPI pateint states his sugars ar doing ok. His baseline sugars are running 107-142.     He states his n Magui Aly MD at [address]  [facility] Left 4/23/2018    Performed by Zulema Gamboa MD at [facility] OR   • Beverly GUILLEN/JIMMY 7/10/2014    Performed by Fadumo Guajardo DO at [facility] Subcutaneous Solution Pen-injector Inject 20-30 Units into the skin 3 (three) times daily before meals.  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   Eyes: Pupils are equal, round, and reactive to light. Conjunctivae and EOM are normal.   Neck: Normal range of motion. Neck supple. Cardiovascular: Normal rate, regular rhythm and normal heart sounds.    Pulmonary/Chest: E 09/11/2016, 10/02/2020   • Flucelvax 0.5 Ml Quad PFS Single Dose 09/19/2018   • Influenza 10/08/2017   • Influenza(Afluria)0.5ml QIV PFS 3yr & older 10/02/2020       Most Recent Immunizations  Administered            Date(s) Administered    FLUZONE 6 month

## 2021-01-12 NOTE — PATIENT INSTRUCTIONS
Always take some form of novolgue. If sugar is less than 100 before your meal : take 10 of novoluge. If greater than 100 take 20   And then use the scale to increase as sugars increase.

## 2021-01-14 ENCOUNTER — TELEPHONE (OUTPATIENT)
Dept: FAMILY MEDICINE CLINIC | Facility: CLINIC | Age: 51
End: 2021-01-14

## 2021-01-14 NOTE — TELEPHONE ENCOUNTER
----- Message from Rosalie Hilliard MD sent at 1/14/2021  8:40 AM CST -----  AIC looks amazing. Continue present managment   Reviewed download, highs in afternoons and evenings. Consider short walk after work to help with sugars and stress.

## 2021-02-02 DIAGNOSIS — E11.00 UNCONTROLLED TYPE 2 DIABETES MELLITUS WITH HYPEROSMOLAR NONKETOTIC HYPERGLYCEMIA (HCC): Primary | ICD-10-CM

## 2021-02-02 RX ORDER — INSULIN DETEMIR 100 [IU]/ML
INJECTION, SOLUTION SUBCUTANEOUS
Qty: 30 ML | Refills: 1 | Status: SHIPPED | OUTPATIENT
Start: 2021-02-02 | End: 2021-08-10

## 2021-02-02 NOTE — TELEPHONE ENCOUNTER
Future appt:    Last Appointment with provider:   1/12/2021(DM)  Last appointment at [facility]:  1/12/2021    insulin detemir 100 UNIT/ML Subcutaneous Solution Pen-injector    30ml  1refill      Filled:11/17/20 Summary: Inject 25 Units into the skin aristides

## 2021-02-09 RX ORDER — PANTOPRAZOLE SODIUM 40 MG/1
TABLET, DELAYED RELEASE ORAL
Qty: 90 TABLET | Refills: 1 | Status: SHIPPED | OUTPATIENT
Start: 2021-02-09

## 2021-03-04 ENCOUNTER — TELEPHONE (OUTPATIENT)
Dept: CARDIOLOGY | Age: 51
End: 2021-03-04

## 2021-03-09 DIAGNOSIS — E11.00 UNCONTROLLED TYPE 2 DIABETES MELLITUS WITH HYPEROSMOLAR NONKETOTIC HYPERGLYCEMIA (HCC): Primary | ICD-10-CM

## 2021-03-11 RX ORDER — PEN NEEDLE, DIABETIC 32GX 5/32"
NEEDLE, DISPOSABLE MISCELLANEOUS
Qty: 200 EACH | Refills: 1 | Status: SHIPPED | OUTPATIENT
Start: 2021-03-11 | End: 2021-05-24

## 2021-03-11 NOTE — TELEPHONE ENCOUNTER
Future appt:    Last Appointment with provider:   1/12/2021  Last appointment at [facility]:  1/12/2021    Insulin Pen Needle (BD PEN NEEDLE YARELI U/F) 32G X 4 MM Does not apply Misc    200eac  1refill      Filled:1/7/21 Summary: Use 5 times daily, as dir

## 2021-03-16 ENCOUNTER — PATIENT OUTREACH (OUTPATIENT)
Dept: FAMILY MEDICINE CLINIC | Facility: CLINIC | Age: 51
End: 2021-03-16

## 2021-03-16 PROBLEM — I42.9 CARDIOMYOPATHY (HCC): Status: ACTIVE | Noted: 2021-03-16

## 2021-03-16 NOTE — PROGRESS NOTES
reached out to patient on 03-16-21 to help set up sleep follow up appointment stated will call back at a later date when he knows his work schedule better

## 2021-03-17 DIAGNOSIS — Z23 NEED FOR VACCINATION: ICD-10-CM

## 2021-03-18 ENCOUNTER — OFFICE VISIT (OUTPATIENT)
Dept: FAMILY MEDICINE CLINIC | Facility: CLINIC | Age: 51
End: 2021-03-18
Payer: COMMERCIAL

## 2021-03-18 ENCOUNTER — LAB ENCOUNTER (OUTPATIENT)
Dept: LAB | Age: 51
End: 2021-03-18
Attending: NURSE PRACTITIONER
Payer: COMMERCIAL

## 2021-03-18 VITALS
TEMPERATURE: 98 F | RESPIRATION RATE: 18 BRPM | HEIGHT: 68.5 IN | SYSTOLIC BLOOD PRESSURE: 120 MMHG | WEIGHT: 206 LBS | OXYGEN SATURATION: 98 % | HEART RATE: 70 BPM | BODY MASS INDEX: 30.86 KG/M2 | DIASTOLIC BLOOD PRESSURE: 76 MMHG

## 2021-03-18 DIAGNOSIS — I42.0 DILATED CARDIOMYOPATHY (HCC): ICD-10-CM

## 2021-03-18 DIAGNOSIS — Z86.16 PERSONAL HISTORY OF COVID-19: ICD-10-CM

## 2021-03-18 DIAGNOSIS — G47.33 OBSTRUCTIVE SLEEP APNEA (ADULT) (PEDIATRIC): Primary | ICD-10-CM

## 2021-03-18 LAB — SARS-COV-2 IGG+IGM SERPL QL IA: REACTIVE

## 2021-03-18 PROCEDURE — 86769 SARS-COV-2 COVID-19 ANTIBODY: CPT | Performed by: NURSE PRACTITIONER

## 2021-03-18 PROCEDURE — 3008F BODY MASS INDEX DOCD: CPT | Performed by: NURSE PRACTITIONER

## 2021-03-18 PROCEDURE — 36415 COLL VENOUS BLD VENIPUNCTURE: CPT | Performed by: NURSE PRACTITIONER

## 2021-03-18 PROCEDURE — 99214 OFFICE O/P EST MOD 30 MIN: CPT | Performed by: NURSE PRACTITIONER

## 2021-03-18 PROCEDURE — 3074F SYST BP LT 130 MM HG: CPT | Performed by: NURSE PRACTITIONER

## 2021-03-18 PROCEDURE — 3078F DIAST BP <80 MM HG: CPT | Performed by: NURSE PRACTITIONER

## 2021-03-18 NOTE — PROGRESS NOTES
Reviewed previous note. Spoke with patient in regards to the Covid vaccine. He is concerned he may still have antibodies from when he had Covid that put him in the hospital for 5 days and affected his pancreas.   He wants to have the antibodies checked to

## 2021-03-18 NOTE — PROGRESS NOTES
HPI/Subjective:   Patient ID: Bernie Horton is a 53year old male. HPI   Patient presents for sleep therapy follow up. He endorses that he cannot sleep without the CPAP machine. He wakes up feeling refreshed.  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  3 each 11   • Accu-Chek FastClix Lancets Does not apply Misc Test blood glucose 4 times daily, before meals and signs/symptoms of hypoglycemia 400 each Behavior: Behavior normal. Behavior is cooperative. Thought Content:  Thought content normal.         Judgment: Judgment normal.         Assessment & Plan:   Obstructive sleep apnea (adult) (pediatric)  (primary encounter diagnosis)  Dilated cardiom provider.               Meds This Visit:  Requested Prescriptions      No prescriptions requested or ordered in this encounter       Imaging & Referrals:  None     JOSH Andrea Student

## 2021-03-18 NOTE — PATIENT INSTRUCTIONS
Lab for Covid antibodies pending. Continue sleep therapy. Follow-up in 6 months - sooner if needed.      Advised if still with sleep apnea and not using CPAP has a  7 fold increase in risk of heart attack, stroke, abnormal heart rhythm  and death,  in

## 2021-03-19 ENCOUNTER — TELEPHONE (OUTPATIENT)
Dept: FAMILY MEDICINE CLINIC | Facility: CLINIC | Age: 51
End: 2021-03-19

## 2021-03-19 NOTE — TELEPHONE ENCOUNTER
----- Message from JULISSA Montiel sent at 3/19/2021  8:54 AM CDT -----  Patient has the Covid antibodies. Please let him know. Thank you. Note to MyChart.

## 2021-04-16 RX ORDER — ATORVASTATIN CALCIUM 40 MG/1
TABLET, FILM COATED ORAL
Qty: 90 TABLET | Refills: 3 | Status: SHIPPED | OUTPATIENT
Start: 2021-04-16

## 2021-04-23 ENCOUNTER — TELEPHONE (OUTPATIENT)
Dept: FAMILY MEDICINE CLINIC | Facility: CLINIC | Age: 51
End: 2021-04-23

## 2021-04-23 RX ORDER — INSULIN ASPART 100 [IU]/ML
INJECTION, SOLUTION INTRAVENOUS; SUBCUTANEOUS
Refills: 1 | OUTPATIENT
Start: 2021-04-23

## 2021-04-23 NOTE — TELEPHONE ENCOUNTER
[facility]         FYI    RE:  RX that was denied      Will resend RX to Dr Maria R Díaz  From [facility], 04/23/21, 2:34 PM

## 2021-04-23 NOTE — TELEPHONE ENCOUNTER
LM for patient to return call. Unclear of what medication is needing refill. It appears an endocrinologist refused to fill aspart insulin recently due to LOV 5 months ago.

## 2021-04-23 NOTE — TELEPHONE ENCOUNTER
Pt would like to know when he is due for his next diabetic f/u. Pharmacy left message stating he is due for one for his prescription renewal. Cardiologist did A1C at [facility] in [place] on 3/30/21.

## 2021-04-23 NOTE — TELEPHONE ENCOUNTER
Patient returned call, reports it is novolog he needs refilled, was sent to wrong physician.     Future appt:    Last Appointment with provider:   1/12/2021 for diabetes follow up care  Last appointment at [facility]:  3/18/2021 for SUHA with CS  Tyler

## 2021-04-28 RX ORDER — CARVEDILOL 25 MG/1
TABLET ORAL
Qty: 180 TABLET | Refills: 3 | OUTPATIENT
Start: 2021-04-28

## 2021-04-28 RX ORDER — CLOPIDOGREL BISULFATE 75 MG/1
TABLET ORAL
Qty: 90 TABLET | Refills: 3 | OUTPATIENT
Start: 2021-04-28

## 2021-04-28 RX ORDER — HYDRALAZINE HYDROCHLORIDE 100 MG/1
TABLET, FILM COATED ORAL
Qty: 270 TABLET | Refills: 1 | OUTPATIENT
Start: 2021-04-28

## 2021-04-30 ENCOUNTER — APPOINTMENT (OUTPATIENT)
Dept: CARDIOLOGY | Age: 51
End: 2021-04-30

## 2021-05-14 ENCOUNTER — APPOINTMENT (OUTPATIENT)
Dept: CARDIOLOGY | Age: 51
End: 2021-05-14

## 2021-05-21 DIAGNOSIS — E11.00 UNCONTROLLED TYPE 2 DIABETES MELLITUS WITH HYPEROSMOLAR NONKETOTIC HYPERGLYCEMIA (HCC): ICD-10-CM

## 2021-05-21 NOTE — TELEPHONE ENCOUNTER
Future appt:    Last Appointment with provider:   1/12/2021 for diabetes follow up.   Last appointment at [facility] [place]:  3/18/2021 with CS for SUHA  Cholesterol, Total (mg/dL)   Date Value   11/11/2020 101     CHOLESTEROL (mg/dL)   Date Value   03/14/2014 12

## 2021-05-24 RX ORDER — PEN NEEDLE, DIABETIC 32GX 5/32"
NEEDLE, DISPOSABLE MISCELLANEOUS
Qty: 200 EACH | Refills: 1 | Status: SHIPPED | OUTPATIENT
Start: 2021-05-24

## 2021-05-24 RX ORDER — PEN NEEDLE, DIABETIC 32GX 5/32"
NEEDLE, DISPOSABLE MISCELLANEOUS
Qty: 200 EACH | Refills: 1 | Status: SHIPPED | OUTPATIENT
Start: 2021-05-24 | End: 2021-05-24

## 2021-05-25 RX ORDER — INSULIN DETEMIR 100 [IU]/ML
INJECTION, SOLUTION SUBCUTANEOUS
COMMUNITY
Start: 2021-03-25

## 2021-05-25 RX ORDER — INSULIN ASPART 100 [IU]/ML
INJECTION, SOLUTION INTRAVENOUS; SUBCUTANEOUS
COMMUNITY
Start: 2021-03-05

## 2021-05-26 ENCOUNTER — TELEPHONE (OUTPATIENT)
Dept: CARDIOLOGY | Age: 51
End: 2021-05-26

## 2021-05-26 ENCOUNTER — OFFICE VISIT (OUTPATIENT)
Dept: CARDIOLOGY | Age: 51
End: 2021-05-26

## 2021-05-26 VITALS
HEART RATE: 60 BPM | HEIGHT: 68 IN | DIASTOLIC BLOOD PRESSURE: 80 MMHG | SYSTOLIC BLOOD PRESSURE: 100 MMHG | WEIGHT: 198.8 LBS | BODY MASS INDEX: 30.13 KG/M2

## 2021-05-26 DIAGNOSIS — I25.118 CORONARY ARTERY DISEASE OF NATIVE ARTERY OF NATIVE HEART WITH STABLE ANGINA PECTORIS (CMD): Primary | ICD-10-CM

## 2021-05-26 DIAGNOSIS — I10 HYPERTENSION, UNSPECIFIED TYPE: ICD-10-CM

## 2021-05-26 DIAGNOSIS — R06.00 DYSPNEA, UNSPECIFIED TYPE: ICD-10-CM

## 2021-05-26 DIAGNOSIS — E78.5 HYPERLIPIDEMIA, UNSPECIFIED HYPERLIPIDEMIA TYPE: ICD-10-CM

## 2021-05-26 DIAGNOSIS — N18.9 ANEMIA DUE TO CHRONIC KIDNEY DISEASE, UNSPECIFIED CKD STAGE: ICD-10-CM

## 2021-05-26 DIAGNOSIS — I15.9 SECONDARY HYPERTENSION: ICD-10-CM

## 2021-05-26 DIAGNOSIS — I42.0 DILATED CARDIOMYOPATHY (CMD): ICD-10-CM

## 2021-05-26 DIAGNOSIS — I50.1 CHRONIC LEFT-SIDED CONGESTIVE HEART FAILURE (CMD): ICD-10-CM

## 2021-05-26 DIAGNOSIS — D63.1 ANEMIA DUE TO CHRONIC KIDNEY DISEASE, UNSPECIFIED CKD STAGE: ICD-10-CM

## 2021-05-26 PROCEDURE — 3074F SYST BP LT 130 MM HG: CPT | Performed by: INTERNAL MEDICINE

## 2021-05-26 PROCEDURE — 99214 OFFICE O/P EST MOD 30 MIN: CPT | Performed by: INTERNAL MEDICINE

## 2021-05-26 PROCEDURE — 3079F DIAST BP 80-89 MM HG: CPT | Performed by: INTERNAL MEDICINE

## 2021-05-26 RX ORDER — PROCHLORPERAZINE 25 MG/1
SUPPOSITORY RECTAL
COMMUNITY
Start: 2021-04-24

## 2021-05-26 ASSESSMENT — PATIENT HEALTH QUESTIONNAIRE - PHQ9
2. FEELING DOWN, DEPRESSED OR HOPELESS: NOT AT ALL
CLINICAL INTERPRETATION OF PHQ9 SCORE: NO FURTHER SCREENING NEEDED
SUM OF ALL RESPONSES TO PHQ9 QUESTIONS 1 AND 2: 0
SUM OF ALL RESPONSES TO PHQ9 QUESTIONS 1 AND 2: 0
1. LITTLE INTEREST OR PLEASURE IN DOING THINGS: NOT AT ALL
CLINICAL INTERPRETATION OF PHQ2 SCORE: NO FURTHER SCREENING NEEDED

## 2021-05-26 ASSESSMENT — ENCOUNTER SYMPTOMS
SUSPICIOUS LESIONS: 0
FEVER: 0
BRUISES/BLEEDS EASILY: 0
HEMOPTYSIS: 0
HEMATOCHEZIA: 0
WEIGHT GAIN: 0
WEIGHT LOSS: 1
CHILLS: 0
COUGH: 0
ALLERGIC/IMMUNOLOGIC COMMENTS: NO NEW FOOD ALLERGIES

## 2021-06-03 ENCOUNTER — TELEPHONE (OUTPATIENT)
Dept: CARDIOLOGY | Age: 51
End: 2021-06-03

## 2021-07-12 ENCOUNTER — TELEPHONE (OUTPATIENT)
Dept: CARDIOLOGY | Age: 51
End: 2021-07-12

## 2021-08-10 DIAGNOSIS — E11.00 UNCONTROLLED TYPE 2 DIABETES MELLITUS WITH HYPEROSMOLAR NONKETOTIC HYPERGLYCEMIA (HCC): ICD-10-CM

## 2021-08-10 RX ORDER — INSULIN DETEMIR 100 [IU]/ML
25 INJECTION, SOLUTION SUBCUTANEOUS EVERY 12 HOURS
Qty: 3 EACH | Refills: 1 | Status: SHIPPED | OUTPATIENT
Start: 2021-08-10 | End: 2021-10-21

## 2021-08-10 NOTE — TELEPHONE ENCOUNTER
LM for patient to return call and schedule annual physical.    Future appt:    Last Appointment with provider: 1/12/21 for diabetes follow up.   Last appointment at [facility]:  3/18/2021 with CS for SUHA  Cholesterol, Total (mg/dL)   Date Value   11/11/20

## 2021-10-21 ENCOUNTER — OFFICE VISIT (OUTPATIENT)
Dept: FAMILY MEDICINE CLINIC | Facility: CLINIC | Age: 51
End: 2021-10-21
Payer: COMMERCIAL

## 2021-10-21 ENCOUNTER — LAB ENCOUNTER (OUTPATIENT)
Dept: LAB | Age: 51
End: 2021-10-21
Attending: NURSE PRACTITIONER
Payer: COMMERCIAL

## 2021-10-21 VITALS
HEART RATE: 84 BPM | SYSTOLIC BLOOD PRESSURE: 120 MMHG | RESPIRATION RATE: 18 BRPM | WEIGHT: 203 LBS | BODY MASS INDEX: 30.41 KG/M2 | DIASTOLIC BLOOD PRESSURE: 70 MMHG | TEMPERATURE: 97 F | HEIGHT: 68.5 IN | OXYGEN SATURATION: 98 %

## 2021-10-21 DIAGNOSIS — E11.649 LOW BLOOD SUGAR IN DIABETES (HCC): ICD-10-CM

## 2021-10-21 DIAGNOSIS — N18.9 CHRONIC KIDNEY DISEASE, UNSPECIFIED: ICD-10-CM

## 2021-10-21 DIAGNOSIS — E11.00 UNCONTROLLED TYPE 2 DIABETES MELLITUS WITH HYPEROSMOLAR NONKETOTIC HYPERGLYCEMIA (HCC): Primary | ICD-10-CM

## 2021-10-21 DIAGNOSIS — I15.9 SECONDARY HYPERTENSION: ICD-10-CM

## 2021-10-21 DIAGNOSIS — D63.1 ANEMIA OF CHRONIC RENAL FAILURE: ICD-10-CM

## 2021-10-21 DIAGNOSIS — R06.00 DYSPNEA, UNSPECIFIED TYPE: ICD-10-CM

## 2021-10-21 DIAGNOSIS — I25.118 CORONARY ARTERY DISEASE OF NATIVE ARTERY OF NATIVE HEART WITH STABLE ANGINA PECTORIS (HCC): Primary | ICD-10-CM

## 2021-10-21 DIAGNOSIS — E78.5 HYPERLIPIDEMIA, UNSPECIFIED HYPERLIPIDEMIA TYPE: ICD-10-CM

## 2021-10-21 DIAGNOSIS — N18.9 ANEMIA OF CHRONIC RENAL FAILURE: ICD-10-CM

## 2021-10-21 DIAGNOSIS — I42.0 DILATED CARDIOMYOPATHY (HCC): ICD-10-CM

## 2021-10-21 DIAGNOSIS — I10 HYPERTENSION, UNSPECIFIED TYPE: ICD-10-CM

## 2021-10-21 DIAGNOSIS — I50.1 CHRONIC LEFT-SIDED CONGESTIVE HEART FAILURE (HCC): ICD-10-CM

## 2021-10-21 PROCEDURE — 3008F BODY MASS INDEX DOCD: CPT | Performed by: NURSE PRACTITIONER

## 2021-10-21 PROCEDURE — 3078F DIAST BP <80 MM HG: CPT | Performed by: NURSE PRACTITIONER

## 2021-10-21 PROCEDURE — 85025 COMPLETE CBC W/AUTO DIFF WBC: CPT | Performed by: NURSE PRACTITIONER

## 2021-10-21 PROCEDURE — 3074F SYST BP LT 130 MM HG: CPT | Performed by: NURSE PRACTITIONER

## 2021-10-21 PROCEDURE — 36415 COLL VENOUS BLD VENIPUNCTURE: CPT | Performed by: NURSE PRACTITIONER

## 2021-10-21 PROCEDURE — 3044F HG A1C LEVEL LT 7.0%: CPT | Performed by: NURSE PRACTITIONER

## 2021-10-21 PROCEDURE — 83036 HEMOGLOBIN GLYCOSYLATED A1C: CPT | Performed by: NURSE PRACTITIONER

## 2021-10-21 PROCEDURE — 80053 COMPREHEN METABOLIC PANEL: CPT | Performed by: NURSE PRACTITIONER

## 2021-10-21 PROCEDURE — 99214 OFFICE O/P EST MOD 30 MIN: CPT | Performed by: NURSE PRACTITIONER

## 2021-10-21 RX ORDER — INSULIN DETEMIR 100 [IU]/ML
27 INJECTION, SOLUTION SUBCUTANEOUS EVERY 12 HOURS
Qty: 3 EACH | Refills: 1 | Status: SHIPPED | OUTPATIENT
Start: 2021-10-21

## 2021-10-21 RX ORDER — BLOOD-GLUCOSE SENSOR
1 EACH MISCELLANEOUS
Qty: 9 EACH | Refills: 3 | Status: SHIPPED | OUTPATIENT
Start: 2021-10-21

## 2021-10-21 RX ORDER — BLOOD-GLUCOSE,RECEIVER,CONT
EACH MISCELLANEOUS
Qty: 1 EACH | Refills: 2 | Status: SHIPPED | OUTPATIENT
Start: 2021-10-21

## 2021-10-21 RX ORDER — EPLERENONE 25 MG/1
2 TABLET, FILM COATED ORAL DAILY
COMMUNITY
Start: 2021-08-29

## 2021-10-21 RX ORDER — AMOXICILLIN 500 MG/1
2000 CAPSULE ORAL ONCE
Qty: 4 CAPSULE | Refills: 2 | Status: SHIPPED | OUTPATIENT
Start: 2021-10-21 | End: 2021-10-21

## 2021-10-21 RX ORDER — KETOCONAZOLE 20 MG/G
1 CREAM TOPICAL 2 TIMES DAILY
Qty: 30 G | Refills: 1 | Status: SHIPPED | OUTPATIENT
Start: 2021-10-21

## 2021-10-21 NOTE — PATIENT INSTRUCTIONS
Increase Levemir to 27 U daily     Stop the Novolog. Use the Nizoral twice a day to the affected area as needed. Labs today    Recheck in 3 month - sooner if needed (schedule as physical with Dr. Nati Sommers).

## 2021-10-21 NOTE — TELEPHONE ENCOUNTER
Future appt:    Last Appointment with provider:   Visit date not found  Last appointment at [facility]:  10/21/2021 with CS for diabetes follow up.   Cholesterol, Total (mg/dL)   Date Value   11/11/2020 101     CHOLESTEROL (mg/dL)   Date Value   03/14/201

## 2021-10-21 NOTE — PROGRESS NOTES
HPI:    Patient ID: Shanel Castillo is a 49year old male. HPI     Patient is present for follow up on diabetes recheck. Has been trying to get off the insulin. States that he is having trouble with Novolog dropping blood sugar too low.  Started on Erriae Axel apply route 4 (four) times daily.  3 each 11   • Glucose Blood (ACCU-CHEK GUIDE) In Vitro Strip Test blood glucose 4 times daily, before meals and signs/symptoms of hypoglycemia 400 strip 1   • Continuous Blood Gluc Sensor (FREESTYLE ALETHEA 14 DAY SENSOR) Do Pupils: Pupils are equal, round, and reactive to light. Cardiovascular:      Rate and Rhythm: Normal rate and regular rhythm. Pulses:           Dorsalis pedis pulses are 2+ on the right side and 2+ on the left side.       Heart sounds: Normal heart s Subcutaneous Solution Pen-injector 3 each 1     Sig: Inject 27 Units into the skin Q12H. • amoxicillin 500 MG Oral Cap 4 capsule 2     Sig: Take 4 capsules (2,000 mg total) by mouth one time for 1 dose. Take 1 hour before dental work.    • ketoconazole 2

## 2021-10-22 RX ORDER — BLOOD-GLUCOSE TRANSMITTER
EACH MISCELLANEOUS
Qty: 1 EACH | Refills: 3 | Status: SHIPPED | OUTPATIENT
Start: 2021-10-22

## 2021-10-23 ENCOUNTER — TELEPHONE (OUTPATIENT)
Dept: FAMILY MEDICINE CLINIC | Facility: CLINIC | Age: 51
End: 2021-10-23

## 2021-10-23 NOTE — TELEPHONE ENCOUNTER
----- Message from JULISSA Ya sent at 10/22/2021  1:36 PM CDT -----  Labs look great except a slight decrease in kidney function. Increase water intake. Note to MyChart.

## 2021-11-04 ENCOUNTER — TELEPHONE (OUTPATIENT)
Dept: FAMILY MEDICINE CLINIC | Facility: CLINIC | Age: 51
End: 2021-11-04

## 2021-11-04 NOTE — TELEPHONE ENCOUNTER
Pt calling regarding his Levemir - he usually gets 2 boxes quantity 30 but this time he received 1 box quantity 15. He states he usually gets the 90 day supply and that Licha increased his dose so he thought he would have more. Please advise.

## 2021-11-04 NOTE — TELEPHONE ENCOUNTER
Spoke with patient. His Levemir increased from 25 units to 27 units. His recent prescription he was given 1 box instead of 2 for the same price.  [facility] and find [facility]     Called [facility]. Verbal given for 3 month supply with 0

## 2022-04-12 ENCOUNTER — LAB ENCOUNTER (OUTPATIENT)
Dept: LAB | Age: 52
End: 2022-04-12
Attending: INTERNAL MEDICINE
Payer: COMMERCIAL

## 2022-04-12 DIAGNOSIS — I50.1 CHRONIC LEFT-SIDED CONGESTIVE HEART FAILURE (HCC): ICD-10-CM

## 2022-04-12 DIAGNOSIS — R06.00 DYSPNEA, UNSPECIFIED TYPE: ICD-10-CM

## 2022-04-12 DIAGNOSIS — I42.0 DILATED CARDIOMYOPATHY (HCC): ICD-10-CM

## 2022-04-12 DIAGNOSIS — N18.9 CHRONIC KIDNEY DISEASE, UNSPECIFIED: ICD-10-CM

## 2022-04-12 DIAGNOSIS — N18.9 ANEMIA OF CHRONIC RENAL FAILURE: ICD-10-CM

## 2022-04-12 DIAGNOSIS — D63.1 ANEMIA OF CHRONIC RENAL FAILURE: ICD-10-CM

## 2022-04-12 DIAGNOSIS — I15.9 SECONDARY HYPERTENSION: ICD-10-CM

## 2022-04-12 DIAGNOSIS — E78.5 HYPERLIPIDEMIA, UNSPECIFIED HYPERLIPIDEMIA TYPE: ICD-10-CM

## 2022-04-12 DIAGNOSIS — I10 HYPERTENSION, UNSPECIFIED TYPE: ICD-10-CM

## 2022-04-12 DIAGNOSIS — I25.118 CORONARY ARTERY DISEASE OF NATIVE ARTERY OF NATIVE HEART WITH STABLE ANGINA PECTORIS (HCC): ICD-10-CM

## 2022-04-12 LAB
ALBUMIN SERPL-MCNC: 3.6 G/DL (ref 3.4–5)
ALBUMIN/GLOB SERPL: 1.1 {RATIO} (ref 1–2)
ALP LIVER SERPL-CCNC: 102 U/L
ALT SERPL-CCNC: 43 U/L
ANION GAP SERPL CALC-SCNC: 4 MMOL/L (ref 0–18)
AST SERPL-CCNC: 26 U/L (ref 15–37)
BASOPHILS # BLD AUTO: 0.03 X10(3) UL (ref 0–0.2)
BASOPHILS NFR BLD AUTO: 0.4 %
BILIRUB SERPL-MCNC: 0.4 MG/DL (ref 0.1–2)
BUN BLD-MCNC: 14 MG/DL (ref 7–18)
CALCIUM BLD-MCNC: 8.5 MG/DL (ref 8.5–10.1)
CHLORIDE SERPL-SCNC: 107 MMOL/L (ref 98–112)
CHOLEST SERPL-MCNC: 119 MG/DL (ref ?–200)
CO2 SERPL-SCNC: 30 MMOL/L (ref 21–32)
CREAT BLD-MCNC: 1.21 MG/DL
EOSINOPHIL # BLD AUTO: 0.2 X10(3) UL (ref 0–0.7)
EOSINOPHIL NFR BLD AUTO: 2.6 %
ERYTHROCYTE [DISTWIDTH] IN BLOOD BY AUTOMATED COUNT: 12.1 %
EST. AVERAGE GLUCOSE BLD GHB EST-MCNC: 134 MG/DL (ref 68–126)
FASTING PATIENT LIPID ANSWER: YES
FASTING STATUS PATIENT QL REPORTED: YES
GLOBULIN PLAS-MCNC: 3.2 G/DL (ref 2.8–4.4)
GLUCOSE BLD-MCNC: 131 MG/DL (ref 70–99)
HBA1C MFR BLD: 6.3 % (ref ?–5.7)
HCT VFR BLD AUTO: 47.3 %
HDLC SERPL-MCNC: 32 MG/DL (ref 40–59)
HGB BLD-MCNC: 15.4 G/DL
IMM GRANULOCYTES # BLD AUTO: 0.02 X10(3) UL (ref 0–1)
IMM GRANULOCYTES NFR BLD: 0.3 %
LDLC SERPL CALC-MCNC: 56 MG/DL (ref ?–100)
LYMPHOCYTES # BLD AUTO: 1.95 X10(3) UL (ref 1–4)
LYMPHOCYTES NFR BLD AUTO: 25.4 %
MCH RBC QN AUTO: 30.7 PG (ref 26–34)
MCHC RBC AUTO-ENTMCNC: 32.6 G/DL (ref 31–37)
MCV RBC AUTO: 94.4 FL
MONOCYTES # BLD AUTO: 0.63 X10(3) UL (ref 0.1–1)
MONOCYTES NFR BLD AUTO: 8.2 %
NEUTROPHILS # BLD AUTO: 4.84 X10 (3) UL (ref 1.5–7.7)
NEUTROPHILS # BLD AUTO: 4.84 X10(3) UL (ref 1.5–7.7)
NEUTROPHILS NFR BLD AUTO: 63.1 %
NONHDLC SERPL-MCNC: 87 MG/DL (ref ?–130)
NT-PROBNP SERPL-MCNC: 32 PG/ML (ref ?–125)
OSMOLALITY SERPL CALC.SUM OF ELEC: 294 MOSM/KG (ref 275–295)
PLATELET # BLD AUTO: 162 10(3)UL (ref 150–450)
POTASSIUM SERPL-SCNC: 4 MMOL/L (ref 3.5–5.1)
PROT SERPL-MCNC: 6.8 G/DL (ref 6.4–8.2)
RBC # BLD AUTO: 5.01 X10(6)UL
SODIUM SERPL-SCNC: 141 MMOL/L (ref 136–145)
TRIGL SERPL-MCNC: 183 MG/DL (ref 30–149)
TSI SER-ACNC: 0.59 MIU/ML (ref 0.36–3.74)
VLDLC SERPL CALC-MCNC: 27 MG/DL (ref 0–30)
WBC # BLD AUTO: 7.7 X10(3) UL (ref 4–11)

## 2022-04-12 PROCEDURE — 85025 COMPLETE CBC W/AUTO DIFF WBC: CPT

## 2022-04-12 PROCEDURE — 3044F HG A1C LEVEL LT 7.0%: CPT | Performed by: NURSE PRACTITIONER

## 2022-04-12 PROCEDURE — 36415 COLL VENOUS BLD VENIPUNCTURE: CPT

## 2022-04-12 PROCEDURE — 80061 LIPID PANEL: CPT

## 2022-04-12 PROCEDURE — 84443 ASSAY THYROID STIM HORMONE: CPT

## 2022-04-12 PROCEDURE — 83036 HEMOGLOBIN GLYCOSYLATED A1C: CPT

## 2022-04-12 PROCEDURE — 80053 COMPREHEN METABOLIC PANEL: CPT

## 2022-04-12 PROCEDURE — 83880 ASSAY OF NATRIURETIC PEPTIDE: CPT

## 2022-05-03 RX ORDER — KETOCONAZOLE 20 MG/G
CREAM TOPICAL
Qty: 30 G | Refills: 1 | Status: SHIPPED | OUTPATIENT
Start: 2022-05-03

## 2022-05-03 NOTE — TELEPHONE ENCOUNTER
Future appt: Your appointments     Date & Time Appointment Department [facility])    May 12, 2022  8:00 AM CDT Sleep Follow Up with Danny Greenberg, [address] ([facility])            [address] [place] [address]  [phone]        Last Appointment with provider: 10/21/21 for diabetes follow up. Last appointment at [facility] [place]:  Visit date not found  Cholesterol, Total (mg/dL)   Date Value   04/12/2022 119     CHOLESTEROL (mg/dL)   Date Value   03/14/2014 123     HDL Cholesterol (mg/dL)   Date Value   04/12/2022 32 (L)     HDL CHOL (mg/dL)   Date Value   03/14/2014 26 (L)     LDL Cholesterol (mg/dL)   Date Value   04/12/2022 56     LDL CHOLESTROL (mg/dL)   Date Value   03/14/2014 77     Triglycerides (mg/dL)   Date Value   04/12/2022 183 (H)     TRIGLYCERIDES (mg/dL)   Date Value   03/14/2014 100     Lab Results   Component Value Date     (H) 04/12/2022    A1C 6.3 (H) 04/12/2022     Lab Results   Component Value Date    T4F 1.2 11/11/2020    TSH 0.593 04/12/2022       No follow-ups on file.

## 2022-05-03 NOTE — TELEPHONE ENCOUNTER
Refill done. Please remind patient that he is due for his annual wellness (and can't be combined with his sleep appointment). Thank you.

## 2022-05-12 ENCOUNTER — OFFICE VISIT (OUTPATIENT)
Dept: FAMILY MEDICINE CLINIC | Facility: CLINIC | Age: 52
End: 2022-05-12
Payer: COMMERCIAL

## 2022-05-12 VITALS
BODY MASS INDEX: 31.64 KG/M2 | OXYGEN SATURATION: 98 % | RESPIRATION RATE: 18 BRPM | TEMPERATURE: 97 F | DIASTOLIC BLOOD PRESSURE: 80 MMHG | SYSTOLIC BLOOD PRESSURE: 114 MMHG | WEIGHT: 208.81 LBS | HEART RATE: 72 BPM | HEIGHT: 68 IN

## 2022-05-12 DIAGNOSIS — G47.33 OBSTRUCTIVE SLEEP APNEA (ADULT) (PEDIATRIC): Primary | ICD-10-CM

## 2022-05-12 PROCEDURE — 3079F DIAST BP 80-89 MM HG: CPT | Performed by: NURSE PRACTITIONER

## 2022-05-12 PROCEDURE — 3008F BODY MASS INDEX DOCD: CPT | Performed by: NURSE PRACTITIONER

## 2022-05-12 PROCEDURE — 3074F SYST BP LT 130 MM HG: CPT | Performed by: NURSE PRACTITIONER

## 2022-05-12 PROCEDURE — 99214 OFFICE O/P EST MOD 30 MIN: CPT | Performed by: NURSE PRACTITIONER

## 2022-06-22 DIAGNOSIS — E11.00 UNCONTROLLED TYPE 2 DIABETES MELLITUS WITH HYPEROSMOLAR NONKETOTIC HYPERGLYCEMIA (HCC): ICD-10-CM

## 2022-06-22 RX ORDER — INSULIN DETEMIR 100 [IU]/ML
INJECTION, SOLUTION SUBCUTANEOUS
Qty: 3 EACH | Refills: 0 | Status: SHIPPED | OUTPATIENT
Start: 2022-06-22

## 2022-06-22 NOTE — TELEPHONE ENCOUNTER
Future appt:    Last Appointment with provider:  10/21/2021; Recheck in 3 month - sooner if needed (schedule as physical with Dr. Jyoti Grossman). Last appointment at [facility]:  5/12/2022  Cholesterol, Total (mg/dL)   Date Value   04/12/2022 119     CHOLESTEROL (mg/dL)   Date Value   03/14/2014 123     HDL Cholesterol (mg/dL)   Date Value   04/12/2022 32 (L)     HDL CHOL (mg/dL)   Date Value   03/14/2014 26 (L)     LDL Cholesterol (mg/dL)   Date Value   04/12/2022 56     LDL CHOLESTROL (mg/dL)   Date Value   03/14/2014 77     Triglycerides (mg/dL)   Date Value   04/12/2022 183 (H)     TRIGLYCERIDES (mg/dL)   Date Value   03/14/2014 100     Lab Results   Component Value Date     (H) 04/12/2022    A1C 6.3 (H) 04/12/2022     Lab Results   Component Value Date    T4F 1.2 11/11/2020    TSH 0.593 04/12/2022     Last RF:  10/21/2021    No follow-ups on file.

## 2022-06-22 NOTE — TELEPHONE ENCOUNTER
----- Message from Bon Varma sent at 6/22/2022  2:26 PM CDT -----  Patient returned your call. He states since he has had recent lab work done in April, he doesn't understand why he has to have px. I explained he was due for annual wellness. He is asking if the labs in April are good enough for now.

## 2022-07-11 NOTE — TELEPHONE ENCOUNTER
Future appt:    Last Appointment with provider:   5/12/2022 SUHA. Last diabetic follow up- 10/21/21  Last appointment at [facility]:  5/12/2022  Cholesterol, Total (mg/dL)   Date Value   04/12/2022 119     CHOLESTEROL (mg/dL)   Date Value   03/14/2014 123     HDL Cholesterol (mg/dL)   Date Value   04/12/2022 32 (L)     HDL CHOL (mg/dL)   Date Value   03/14/2014 26 (L)     LDL Cholesterol (mg/dL)   Date Value   04/12/2022 56     LDL CHOLESTROL (mg/dL)   Date Value   03/14/2014 77     Triglycerides (mg/dL)   Date Value   04/12/2022 183 (H)     TRIGLYCERIDES (mg/dL)   Date Value   03/14/2014 100     Lab Results   Component Value Date     (H) 04/12/2022    A1C 6.3 (H) 04/12/2022     Lab Results   Component Value Date    T4F 1.2 11/11/2020    TSH 0.593 04/12/2022       No follow-ups on file.

## 2022-07-15 RX ORDER — BLOOD-GLUCOSE TRANSMITTER
EACH MISCELLANEOUS
Refills: 2 | OUTPATIENT
Start: 2022-07-15

## 2022-08-28 NOTE — TELEPHONE ENCOUNTER
His initial was fine on 10/18 but his other glucose values were higher. Would recommend patient to check his blood sugars twice a day   Recommend we add AIC to next lab draw.
discussed with pt and he will check his sugar qid, and doing insulin shots and occasionally too low.    Needs labs still   Had thyroid at hospital.   Recommend labs at our office
has one more question   RE: BW results    Blood sugars  in line ?
5

## 2022-10-03 ENCOUNTER — LAB ENCOUNTER (OUTPATIENT)
Dept: LAB | Age: 52
End: 2022-10-03
Attending: NURSE PRACTITIONER
Payer: COMMERCIAL

## 2022-10-03 ENCOUNTER — OFFICE VISIT (OUTPATIENT)
Dept: FAMILY MEDICINE CLINIC | Facility: CLINIC | Age: 52
End: 2022-10-03
Payer: COMMERCIAL

## 2022-10-03 VITALS
OXYGEN SATURATION: 98 % | WEIGHT: 208 LBS | BODY MASS INDEX: 31.52 KG/M2 | HEART RATE: 70 BPM | RESPIRATION RATE: 18 BRPM | DIASTOLIC BLOOD PRESSURE: 76 MMHG | HEIGHT: 68 IN | SYSTOLIC BLOOD PRESSURE: 106 MMHG | TEMPERATURE: 97 F

## 2022-10-03 DIAGNOSIS — Z12.5 PROSTATE CANCER SCREENING: ICD-10-CM

## 2022-10-03 DIAGNOSIS — G47.33 OBSTRUCTIVE SLEEP APNEA (ADULT) (PEDIATRIC): ICD-10-CM

## 2022-10-03 DIAGNOSIS — Z13.21 ENCOUNTER FOR VITAMIN DEFICIENCY SCREENING: ICD-10-CM

## 2022-10-03 DIAGNOSIS — Z13.1 DIABETES MELLITUS SCREENING: ICD-10-CM

## 2022-10-03 DIAGNOSIS — Z13.0 SCREENING FOR DEFICIENCY ANEMIA: ICD-10-CM

## 2022-10-03 DIAGNOSIS — E11.00 UNCONTROLLED TYPE 2 DIABETES MELLITUS WITH HYPEROSMOLAR NONKETOTIC HYPERGLYCEMIA (HCC): ICD-10-CM

## 2022-10-03 DIAGNOSIS — I10 PRIMARY HYPERTENSION: ICD-10-CM

## 2022-10-03 DIAGNOSIS — E78.2 MIXED HYPERLIPIDEMIA: ICD-10-CM

## 2022-10-03 DIAGNOSIS — Z13.29 THYROID DISORDER SCREEN: ICD-10-CM

## 2022-10-03 DIAGNOSIS — Z13.6 SCREENING FOR CARDIOVASCULAR CONDITION: ICD-10-CM

## 2022-10-03 DIAGNOSIS — I42.0 DILATED CARDIOMYOPATHY (HCC): ICD-10-CM

## 2022-10-03 DIAGNOSIS — Z00.00 WELLNESS EXAMINATION: Primary | ICD-10-CM

## 2022-10-03 DIAGNOSIS — R73.09 ELEVATED GLUCOSE: ICD-10-CM

## 2022-10-03 LAB
ALBUMIN SERPL-MCNC: 3.8 G/DL (ref 3.4–5)
ALBUMIN/GLOB SERPL: 1 {RATIO} (ref 1–2)
ALP LIVER SERPL-CCNC: 106 U/L
ALT SERPL-CCNC: 74 U/L
ANION GAP SERPL CALC-SCNC: 4 MMOL/L (ref 0–18)
AST SERPL-CCNC: 43 U/L (ref 15–37)
BASOPHILS # BLD AUTO: 0.02 X10(3) UL (ref 0–0.2)
BASOPHILS NFR BLD AUTO: 0.2 %
BILIRUB SERPL-MCNC: 0.8 MG/DL (ref 0.1–2)
BUN BLD-MCNC: 13 MG/DL (ref 7–18)
CALCIUM BLD-MCNC: 9.2 MG/DL (ref 8.5–10.1)
CHLORIDE SERPL-SCNC: 107 MMOL/L (ref 98–112)
CO2 SERPL-SCNC: 28 MMOL/L (ref 21–32)
COMPLEXED PSA SERPL-MCNC: 0.54 NG/ML (ref ?–4)
CREAT BLD-MCNC: 1.26 MG/DL
CREAT UR-SCNC: 163 MG/DL
EOSINOPHIL # BLD AUTO: 0.23 X10(3) UL (ref 0–0.7)
EOSINOPHIL NFR BLD AUTO: 2.6 %
ERYTHROCYTE [DISTWIDTH] IN BLOOD BY AUTOMATED COUNT: 12.2 %
EST. AVERAGE GLUCOSE BLD GHB EST-MCNC: 143 MG/DL (ref 68–126)
FASTING STATUS PATIENT QL REPORTED: NO
GFR SERPLBLD BASED ON 1.73 SQ M-ARVRAT: 69 ML/MIN/1.73M2 (ref 60–?)
GLOBULIN PLAS-MCNC: 3.9 G/DL (ref 2.8–4.4)
GLUCOSE BLD-MCNC: 146 MG/DL (ref 70–99)
HBA1C MFR BLD: 6.6 % (ref ?–5.7)
HCT VFR BLD AUTO: 48.1 %
HGB BLD-MCNC: 15.8 G/DL
IMM GRANULOCYTES # BLD AUTO: 0.03 X10(3) UL (ref 0–1)
IMM GRANULOCYTES NFR BLD: 0.3 %
LYMPHOCYTES # BLD AUTO: 1.6 X10(3) UL (ref 1–4)
LYMPHOCYTES NFR BLD AUTO: 18.4 %
MCH RBC QN AUTO: 31.5 PG (ref 26–34)
MCHC RBC AUTO-ENTMCNC: 32.8 G/DL (ref 31–37)
MCV RBC AUTO: 95.8 FL
MICROALBUMIN UR-MCNC: 1.44 MG/DL
MICROALBUMIN/CREAT 24H UR-RTO: 8.8 UG/MG (ref ?–30)
MONOCYTES # BLD AUTO: 0.49 X10(3) UL (ref 0.1–1)
MONOCYTES NFR BLD AUTO: 5.6 %
NEUTROPHILS # BLD AUTO: 6.33 X10 (3) UL (ref 1.5–7.7)
NEUTROPHILS # BLD AUTO: 6.33 X10(3) UL (ref 1.5–7.7)
NEUTROPHILS NFR BLD AUTO: 72.9 %
OSMOLALITY SERPL CALC.SUM OF ELEC: 291 MOSM/KG (ref 275–295)
PLATELET # BLD AUTO: 168 10(3)UL (ref 150–450)
POTASSIUM SERPL-SCNC: 4 MMOL/L (ref 3.5–5.1)
PROT SERPL-MCNC: 7.7 G/DL (ref 6.4–8.2)
RBC # BLD AUTO: 5.02 X10(6)UL
SODIUM SERPL-SCNC: 139 MMOL/L (ref 136–145)
TSI SER-ACNC: 0.77 MIU/ML (ref 0.36–3.74)
VIT D+METAB SERPL-MCNC: 98.9 NG/ML (ref 30–100)
WBC # BLD AUTO: 8.7 X10(3) UL (ref 4–11)

## 2022-10-03 PROCEDURE — 3008F BODY MASS INDEX DOCD: CPT | Performed by: NURSE PRACTITIONER

## 2022-10-03 PROCEDURE — 36415 COLL VENOUS BLD VENIPUNCTURE: CPT | Performed by: NURSE PRACTITIONER

## 2022-10-03 PROCEDURE — 82043 UR ALBUMIN QUANTITATIVE: CPT | Performed by: NURSE PRACTITIONER

## 2022-10-03 PROCEDURE — 3044F HG A1C LEVEL LT 7.0%: CPT | Performed by: NURSE PRACTITIONER

## 2022-10-03 PROCEDURE — 3074F SYST BP LT 130 MM HG: CPT | Performed by: NURSE PRACTITIONER

## 2022-10-03 PROCEDURE — 83036 HEMOGLOBIN GLYCOSYLATED A1C: CPT | Performed by: NURSE PRACTITIONER

## 2022-10-03 PROCEDURE — 84443 ASSAY THYROID STIM HORMONE: CPT | Performed by: NURSE PRACTITIONER

## 2022-10-03 PROCEDURE — 3078F DIAST BP <80 MM HG: CPT | Performed by: NURSE PRACTITIONER

## 2022-10-03 PROCEDURE — 85025 COMPLETE CBC W/AUTO DIFF WBC: CPT | Performed by: NURSE PRACTITIONER

## 2022-10-03 PROCEDURE — 99396 PREV VISIT EST AGE 40-64: CPT | Performed by: NURSE PRACTITIONER

## 2022-10-03 PROCEDURE — 82570 ASSAY OF URINE CREATININE: CPT | Performed by: NURSE PRACTITIONER

## 2022-10-03 PROCEDURE — 82306 VITAMIN D 25 HYDROXY: CPT | Performed by: NURSE PRACTITIONER

## 2022-10-03 PROCEDURE — 3061F NEG MICROALBUMINURIA REV: CPT | Performed by: NURSE PRACTITIONER

## 2022-10-03 PROCEDURE — 80053 COMPREHEN METABOLIC PANEL: CPT | Performed by: NURSE PRACTITIONER

## 2022-10-03 RX ORDER — BLOOD-GLUCOSE SENSOR
1 EACH MISCELLANEOUS
Qty: 9 EACH | Refills: 3 | Status: SHIPPED | OUTPATIENT
Start: 2022-10-03

## 2022-10-03 RX ORDER — BLOOD-GLUCOSE TRANSMITTER
1 EACH MISCELLANEOUS
Qty: 1 EACH | Refills: 3 | Status: SHIPPED | OUTPATIENT
Start: 2022-10-03

## 2022-10-03 NOTE — PATIENT INSTRUCTIONS
Healthy exam    Labs pending. Complete Cologuard testing at home. Continue current therapy     Follow up in 6 months - sooner if needed.

## 2022-10-06 DIAGNOSIS — R74.8 ELEVATED LIVER ENZYMES: Primary | ICD-10-CM

## 2023-01-03 NOTE — TELEPHONE ENCOUNTER
Future appt:    Last Appointment with provider:   10/3/2022  Last appointment at [facility]:  10/3/2022  Last px: 10/3/2022- recheck in 6 months    Cholesterol, Total (mg/dL)   Date Value   04/12/2022 119     CHOLESTEROL (mg/dL)   Date Value   03/14/2014 123     HDL Cholesterol (mg/dL)   Date Value   04/12/2022 32 (L)     HDL CHOL (mg/dL)   Date Value   03/14/2014 26 (L)     LDL Cholesterol (mg/dL)   Date Value   04/12/2022 56     LDL CHOLESTROL (mg/dL)   Date Value   03/14/2014 77     Triglycerides (mg/dL)   Date Value   04/12/2022 183 (H)     TRIGLYCERIDES (mg/dL)   Date Value   03/14/2014 100     Lab Results   Component Value Date     (H) 10/03/2022    A1C 6.6 (H) 10/03/2022     Lab Results   Component Value Date    T4F 1.2 11/11/2020    TSH 0.770 10/03/2022       No follow-ups on file.

## 2023-01-04 RX ORDER — KETOCONAZOLE 20 MG/G
CREAM TOPICAL
Qty: 30 G | Refills: 1 | Status: SHIPPED | OUTPATIENT
Start: 2023-01-04

## 2023-02-01 ENCOUNTER — TELEPHONE (OUTPATIENT)
Dept: FAMILY MEDICINE CLINIC | Facility: CLINIC | Age: 53
End: 2023-02-01

## 2023-02-12 DIAGNOSIS — E11.00 UNCONTROLLED TYPE 2 DIABETES MELLITUS WITH HYPEROSMOLAR NONKETOTIC HYPERGLYCEMIA (HCC): ICD-10-CM

## 2023-02-13 NOTE — TELEPHONE ENCOUNTER
LEVEMIR FLEXTOUCH 100 UNIT/ML Subcutaneous Solution Pen-injector     #34  R-0       Summary: INJECT 27 UNITS INTO THE SKIN EVERY 12 HOURS        Last refill- 6/22/22  Last visit- 10/3/22    Future appt:    Last Appointment with provider:   10/3/2022  Last appointment at [facility]:  10/3/2022  Cholesterol, Total (mg/dL)   Date Value   04/12/2022 119     CHOLESTEROL (mg/dL)   Date Value   03/14/2014 123     HDL Cholesterol (mg/dL)   Date Value   04/12/2022 32 (L)     HDL CHOL (mg/dL)   Date Value   03/14/2014 26 (L)     LDL Cholesterol (mg/dL)   Date Value   04/12/2022 56     LDL CHOLESTROL (mg/dL)   Date Value   03/14/2014 77     Triglycerides (mg/dL)   Date Value   04/12/2022 183 (H)     TRIGLYCERIDES (mg/dL)   Date Value   03/14/2014 100     Lab Results   Component Value Date     (H) 10/03/2022    A1C 6.6 (H) 10/03/2022     Lab Results   Component Value Date    T4F 1.2 11/11/2020    TSH 0.770 10/03/2022       No follow-ups on file.

## 2023-02-14 RX ORDER — INSULIN DETEMIR 100 [IU]/ML
INJECTION, SOLUTION SUBCUTANEOUS
Qty: 9 ML | Refills: 0 | Status: SHIPPED | OUTPATIENT
Start: 2023-02-14

## 2023-02-20 LAB — AMB EXT COLOGUARD RESULT: NEGATIVE

## 2023-02-28 ENCOUNTER — TELEPHONE (OUTPATIENT)
Dept: FAMILY MEDICINE CLINIC | Facility: CLINIC | Age: 53
End: 2023-02-28

## 2023-03-16 DIAGNOSIS — E11.00 UNCONTROLLED TYPE 2 DIABETES MELLITUS WITH HYPEROSMOLAR NONKETOTIC HYPERGLYCEMIA (HCC): ICD-10-CM

## 2023-03-16 RX ORDER — INSULIN DETEMIR 100 [IU]/ML
27 INJECTION, SOLUTION SUBCUTANEOUS EVERY 12 HOURS
Qty: 9 ML | Refills: 0 | Status: SHIPPED | OUTPATIENT
Start: 2023-03-16

## 2023-03-16 NOTE — TELEPHONE ENCOUNTER
Per [facility]- Levemir flextouch was discontinued. Please change to flexpen     Future appt:    Last Appointment with provider:   10/3/2022 Follow up in 6 months. Last appointment at [facility]:  10/3/2022  Cholesterol, Total (mg/dL)   Date Value   04/12/2022 119     CHOLESTEROL (mg/dL)   Date Value   03/14/2014 123     HDL Cholesterol (mg/dL)   Date Value   04/12/2022 32 (L)     HDL CHOL (mg/dL)   Date Value   03/14/2014 26 (L)     LDL Cholesterol (mg/dL)   Date Value   04/12/2022 56     LDL CHOLESTROL (mg/dL)   Date Value   03/14/2014 77     Triglycerides (mg/dL)   Date Value   04/12/2022 183 (H)     TRIGLYCERIDES (mg/dL)   Date Value   03/14/2014 100     Lab Results   Component Value Date     (H) 10/03/2022    A1C 6.6 (H) 10/03/2022     Lab Results   Component Value Date    T4F 1.2 11/11/2020    TSH 0.770 10/03/2022       No follow-ups on file.

## 2023-03-20 DIAGNOSIS — E11.00 UNCONTROLLED TYPE 2 DIABETES MELLITUS WITH HYPEROSMOLAR NONKETOTIC HYPERGLYCEMIA (HCC): ICD-10-CM

## 2023-03-20 RX ORDER — INSULIN DETEMIR 100 [IU]/ML
27 INJECTION, SOLUTION SUBCUTANEOUS EVERY 12 HOURS
Qty: 50 EACH | Refills: 0 | Status: SHIPPED | OUTPATIENT
Start: 2023-03-20

## 2023-03-21 ENCOUNTER — TELEPHONE (OUTPATIENT)
Dept: FAMILY MEDICINE CLINIC | Facility: CLINIC | Age: 53
End: 2023-03-21

## 2023-03-21 RX ORDER — INSULIN DETEMIR 100 [IU]/ML
27 INJECTION, SOLUTION SUBCUTANEOUS EVERY 12 HOURS
Qty: 60 ML | Refills: 2 | Status: SHIPPED | OUTPATIENT
Start: 2023-03-21

## 2023-03-21 NOTE — TELEPHONE ENCOUNTER
Levemir flex touch is discontinued new script needed to change to levemir flex pen 60ml for insurance to pay

## 2023-03-21 NOTE — TELEPHONE ENCOUNTER
Levimir Flexpen pended below at 60 mL with 2 refills, as indicated by insurance. Please review and sign if ok.

## 2023-03-27 DIAGNOSIS — E11.00 UNCONTROLLED TYPE 2 DIABETES MELLITUS WITH HYPEROSMOLAR NONKETOTIC HYPERGLYCEMIA (HCC): ICD-10-CM

## 2023-03-27 RX ORDER — PEN NEEDLE, DIABETIC 32GX 5/32"
NEEDLE, DISPOSABLE MISCELLANEOUS
Qty: 100 EACH | Refills: 1 | Status: SHIPPED | OUTPATIENT
Start: 2023-03-27

## 2023-03-27 NOTE — TELEPHONE ENCOUNTER
Future appt: Your appointments     Date & Time Appointment Department [facility])    Mar 31, 2023  9:15 AM CDT what is this with REF Brent Ch Dalton ([facility])            [address]  [facility]  [id number]  [phone]        Last Appointment with provider:   10/3/2022 for annual physical.  Last appointment at [facility]:  10/3/2022  Cholesterol, Total (mg/dL)   Date Value   04/12/2022 119     CHOLESTEROL (mg/dL)   Date Value   03/14/2014 123     HDL Cholesterol (mg/dL)   Date Value   04/12/2022 32 (L)     HDL CHOL (mg/dL)   Date Value   03/14/2014 26 (L)     LDL Cholesterol (mg/dL)   Date Value   04/12/2022 56     LDL CHOLESTROL (mg/dL)   Date Value   03/14/2014 77     Triglycerides (mg/dL)   Date Value   04/12/2022 183 (H)     TRIGLYCERIDES (mg/dL)   Date Value   03/14/2014 100     Lab Results   Component Value Date     (H) 10/03/2022    A1C 6.6 (H) 10/03/2022     Lab Results   Component Value Date    T4F 1.2 11/11/2020    TSH 0.770 10/03/2022       No follow-ups on file.

## 2023-03-29 ENCOUNTER — TELEPHONE (OUTPATIENT)
Dept: FAMILY MEDICINE CLINIC | Facility: CLINIC | Age: 53
End: 2023-03-29

## 2023-03-29 DIAGNOSIS — E11.00 UNCONTROLLED TYPE 2 DIABETES MELLITUS WITH HYPEROSMOLAR NONKETOTIC HYPERGLYCEMIA (HCC): ICD-10-CM

## 2023-03-30 RX ORDER — PEN NEEDLE, DIABETIC 32GX 5/32"
NEEDLE, DISPOSABLE MISCELLANEOUS
Qty: 200 EACH | Refills: 1 | Status: SHIPPED | OUTPATIENT
Start: 2023-03-30

## 2023-03-31 ENCOUNTER — LABORATORY ENCOUNTER (OUTPATIENT)
Dept: LAB | Age: 53
End: 2023-03-31
Attending: FAMILY MEDICINE
Payer: COMMERCIAL

## 2023-03-31 DIAGNOSIS — E78.5 HYPERLIPEMIA: ICD-10-CM

## 2023-03-31 DIAGNOSIS — R94.31 ABNORMAL EKG: ICD-10-CM

## 2023-03-31 DIAGNOSIS — I42.0 DILATED CARDIOMYOPATHY (HCC): ICD-10-CM

## 2023-03-31 DIAGNOSIS — I10 HTN (HYPERTENSION): ICD-10-CM

## 2023-03-31 DIAGNOSIS — I49.1 SUPRAVENTRICULAR PREMATURE BEATS: ICD-10-CM

## 2023-03-31 DIAGNOSIS — I47.29 NSVT (NONSUSTAINED VENTRICULAR TACHYCARDIA) (HCC): ICD-10-CM

## 2023-03-31 DIAGNOSIS — I25.10 CAD (CORONARY ARTERY DISEASE): ICD-10-CM

## 2023-03-31 DIAGNOSIS — Z95.5 S/P PRIMARY ANGIOPLASTY WITH CORONARY STENT: ICD-10-CM

## 2023-03-31 DIAGNOSIS — I50.1 LEFT HEART FAILURE (HCC): ICD-10-CM

## 2023-03-31 LAB
ALBUMIN SERPL-MCNC: 3.9 G/DL (ref 3.4–5)
ALBUMIN/GLOB SERPL: 1.1 {RATIO} (ref 1–2)
ALP LIVER SERPL-CCNC: 92 U/L
ALT SERPL-CCNC: 58 U/L
ANION GAP SERPL CALC-SCNC: 7 MMOL/L (ref 0–18)
AST SERPL-CCNC: 40 U/L (ref 15–37)
BASOPHILS # BLD AUTO: 0.03 X10(3) UL (ref 0–0.2)
BASOPHILS NFR BLD AUTO: 0.4 %
BILIRUB SERPL-MCNC: 1 MG/DL (ref 0.1–2)
BUN BLD-MCNC: 17 MG/DL (ref 7–18)
CALCIUM BLD-MCNC: 9.1 MG/DL (ref 8.5–10.1)
CHLORIDE SERPL-SCNC: 104 MMOL/L (ref 98–112)
CHOLEST SERPL-MCNC: 115 MG/DL (ref ?–200)
CO2 SERPL-SCNC: 27 MMOL/L (ref 21–32)
CREAT BLD-MCNC: 1.32 MG/DL
EOSINOPHIL # BLD AUTO: 0.16 X10(3) UL (ref 0–0.7)
EOSINOPHIL NFR BLD AUTO: 2.3 %
ERYTHROCYTE [DISTWIDTH] IN BLOOD BY AUTOMATED COUNT: 12.4 %
EST. AVERAGE GLUCOSE BLD GHB EST-MCNC: 137 MG/DL (ref 68–126)
FASTING PATIENT LIPID ANSWER: YES
FASTING STATUS PATIENT QL REPORTED: YES
GFR SERPLBLD BASED ON 1.73 SQ M-ARVRAT: 65 ML/MIN/1.73M2 (ref 60–?)
GLOBULIN PLAS-MCNC: 3.7 G/DL (ref 2.8–4.4)
GLUCOSE BLD-MCNC: 137 MG/DL (ref 70–99)
HBA1C MFR BLD: 6.4 % (ref ?–5.7)
HCT VFR BLD AUTO: 50.5 %
HDLC SERPL-MCNC: 37 MG/DL (ref 40–59)
HGB BLD-MCNC: 16.6 G/DL
IMM GRANULOCYTES # BLD AUTO: 0.02 X10(3) UL (ref 0–1)
IMM GRANULOCYTES NFR BLD: 0.3 %
LDLC SERPL CALC-MCNC: 53 MG/DL (ref ?–100)
LYMPHOCYTES # BLD AUTO: 1.87 X10(3) UL (ref 1–4)
LYMPHOCYTES NFR BLD AUTO: 26.7 %
MCH RBC QN AUTO: 31.3 PG (ref 26–34)
MCHC RBC AUTO-ENTMCNC: 32.9 G/DL (ref 31–37)
MCV RBC AUTO: 95.1 FL
MONOCYTES # BLD AUTO: 0.49 X10(3) UL (ref 0.1–1)
MONOCYTES NFR BLD AUTO: 7 %
NEUTROPHILS # BLD AUTO: 4.44 X10 (3) UL (ref 1.5–7.7)
NEUTROPHILS # BLD AUTO: 4.44 X10(3) UL (ref 1.5–7.7)
NEUTROPHILS NFR BLD AUTO: 63.3 %
NONHDLC SERPL-MCNC: 78 MG/DL (ref ?–130)
NT-PROBNP SERPL-MCNC: 19 PG/ML (ref ?–125)
OSMOLALITY SERPL CALC.SUM OF ELEC: 290 MOSM/KG (ref 275–295)
PLATELET # BLD AUTO: 162 10(3)UL (ref 150–450)
POTASSIUM SERPL-SCNC: 4 MMOL/L (ref 3.5–5.1)
PROT SERPL-MCNC: 7.6 G/DL (ref 6.4–8.2)
RBC # BLD AUTO: 5.31 X10(6)UL
SODIUM SERPL-SCNC: 138 MMOL/L (ref 136–145)
TRIGL SERPL-MCNC: 146 MG/DL (ref 30–149)
TSI SER-ACNC: 0.51 MIU/ML (ref 0.36–3.74)
VIT D+METAB SERPL-MCNC: 78.9 NG/ML (ref 30–100)
VLDLC SERPL CALC-MCNC: 21 MG/DL (ref 0–30)
WBC # BLD AUTO: 7 X10(3) UL (ref 4–11)

## 2023-03-31 PROCEDURE — 85025 COMPLETE CBC W/AUTO DIFF WBC: CPT

## 2023-03-31 PROCEDURE — 84443 ASSAY THYROID STIM HORMONE: CPT

## 2023-03-31 PROCEDURE — 80053 COMPREHEN METABOLIC PANEL: CPT

## 2023-03-31 PROCEDURE — 3044F HG A1C LEVEL LT 7.0%: CPT | Performed by: NURSE PRACTITIONER

## 2023-03-31 PROCEDURE — 83880 ASSAY OF NATRIURETIC PEPTIDE: CPT

## 2023-03-31 PROCEDURE — 83036 HEMOGLOBIN GLYCOSYLATED A1C: CPT

## 2023-03-31 PROCEDURE — 80061 LIPID PANEL: CPT

## 2023-03-31 PROCEDURE — 82306 VITAMIN D 25 HYDROXY: CPT

## 2023-03-31 PROCEDURE — 36415 COLL VENOUS BLD VENIPUNCTURE: CPT

## 2023-05-19 DIAGNOSIS — I42.3 ENDOMYOCARDIAL DISEASE (HCC): Primary | ICD-10-CM

## 2023-05-19 RX ORDER — AMOXICILLIN 500 MG/1
2000 CAPSULE ORAL ONCE
Qty: 4 CAPSULE | Refills: 2 | Status: SHIPPED | OUTPATIENT
Start: 2023-05-19 | End: 2023-05-19

## 2023-05-19 NOTE — TELEPHONE ENCOUNTER
Last Refill: 10/21/2021 4 Capsules with 2 refills   Take 4 capsules (2,000 mg total) by mouth one time for 1 dose. Take 1 hour before dental work. Last Px: 10/2/2022    Future appt:    Last with provider:   Visit date not found  Last appointment at [facility] [place]:  Visit date not found  Cholesterol, Total (mg/dL)   Date Value   03/31/2023 115     CHOLESTEROL (mg/dL)   Date Value   03/14/2014 123     HDL Cholesterol (mg/dL)   Date Value   03/31/2023 37 (L)     HDL CHOL (mg/dL)   Date Value   03/14/2014 26 (L)     LDL Cholesterol (mg/dL)   Date Value   03/31/2023 53     LDL CHOLESTROL (mg/dL)   Date Value   03/14/2014 77     Triglycerides (mg/dL)   Date Value   03/31/2023 146     TRIGLYCERIDES (mg/dL)   Date Value   03/14/2014 100     Lab Results   Component Value Date     (H) 03/31/2023    A1C 6.4 (H) 03/31/2023     Lab Results   Component Value Date    T4F 1.2 11/11/2020    TSH 0.514 03/31/2023       No follow-ups on file.

## 2023-05-26 ENCOUNTER — TELEPHONE (OUTPATIENT)
Dept: FAMILY MEDICINE CLINIC | Facility: CLINIC | Age: 53
End: 2023-05-26

## 2023-05-26 NOTE — TELEPHONE ENCOUNTER
Patient needs sleep follow up appointment to renew CPAP supplies. Assisted patient scheduling this appointment.     Future Appointments   Date Time Provider Shahnaz Logan   6/5/2023  8:30 AM Kendy Philippe APRN [facility]

## 2023-05-26 NOTE — TELEPHONE ENCOUNTER
Patient states he got a call from [employer] [facility] (voicemail) saying they sent something to [facility] for signature and it wasn't sent back - he didn't fully understand what we submitted to [employer] [facility]    Call back [phone]

## 2023-06-05 ENCOUNTER — OFFICE VISIT (OUTPATIENT)
Dept: FAMILY MEDICINE CLINIC | Facility: CLINIC | Age: 53
End: 2023-06-05
Payer: COMMERCIAL

## 2023-06-05 VITALS
DIASTOLIC BLOOD PRESSURE: 78 MMHG | WEIGHT: 202.81 LBS | BODY MASS INDEX: 30.74 KG/M2 | HEIGHT: 68 IN | HEART RATE: 70 BPM | TEMPERATURE: 97 F | OXYGEN SATURATION: 97 % | RESPIRATION RATE: 18 BRPM | SYSTOLIC BLOOD PRESSURE: 126 MMHG

## 2023-06-05 DIAGNOSIS — G47.33 OBSTRUCTIVE SLEEP APNEA (ADULT) (PEDIATRIC): Primary | ICD-10-CM

## 2023-06-05 PROCEDURE — 3078F DIAST BP <80 MM HG: CPT | Performed by: NURSE PRACTITIONER

## 2023-06-05 PROCEDURE — 3074F SYST BP LT 130 MM HG: CPT | Performed by: NURSE PRACTITIONER

## 2023-06-05 PROCEDURE — 3008F BODY MASS INDEX DOCD: CPT | Performed by: NURSE PRACTITIONER

## 2023-06-05 PROCEDURE — 99214 OFFICE O/P EST MOD 30 MIN: CPT | Performed by: NURSE PRACTITIONER

## 2023-06-05 RX ORDER — AMOXICILLIN 500 MG/1
CAPSULE ORAL
COMMUNITY
Start: 2023-05-19

## 2023-08-29 RX ORDER — PROCHLORPERAZINE 25 MG/1
1 SUPPOSITORY RECTAL CONTINUOUS
Qty: 1 EACH | Refills: 3 | Status: SHIPPED | OUTPATIENT
Start: 2023-08-29

## 2023-08-29 RX ORDER — PROCHLORPERAZINE 25 MG/1
1 SUPPOSITORY RECTAL CONTINUOUS
Qty: 1 EACH | Refills: 3 | Status: SHIPPED | OUTPATIENT
Start: 2023-08-29 | End: 2023-08-31

## 2023-08-31 ENCOUNTER — OFFICE VISIT (OUTPATIENT)
Dept: FAMILY MEDICINE CLINIC | Facility: CLINIC | Age: 53
End: 2023-08-31
Payer: COMMERCIAL

## 2023-08-31 ENCOUNTER — TELEPHONE (OUTPATIENT)
Dept: FAMILY MEDICINE CLINIC | Facility: CLINIC | Age: 53
End: 2023-08-31

## 2023-08-31 VITALS
DIASTOLIC BLOOD PRESSURE: 68 MMHG | WEIGHT: 199.81 LBS | OXYGEN SATURATION: 98 % | TEMPERATURE: 98 F | RESPIRATION RATE: 18 BRPM | HEIGHT: 68 IN | BODY MASS INDEX: 30.28 KG/M2 | SYSTOLIC BLOOD PRESSURE: 124 MMHG

## 2023-08-31 DIAGNOSIS — E78.2 MIXED HYPERLIPIDEMIA: ICD-10-CM

## 2023-08-31 DIAGNOSIS — I42.0 DILATED CARDIOMYOPATHY (HCC): ICD-10-CM

## 2023-08-31 DIAGNOSIS — I10 PRIMARY HYPERTENSION: ICD-10-CM

## 2023-08-31 DIAGNOSIS — E11.00 UNCONTROLLED TYPE 2 DIABETES MELLITUS WITH HYPEROSMOLAR NONKETOTIC HYPERGLYCEMIA (HCC): ICD-10-CM

## 2023-08-31 DIAGNOSIS — Z00.00 WELLNESS EXAMINATION: Primary | ICD-10-CM

## 2023-08-31 DIAGNOSIS — G47.33 OBSTRUCTIVE SLEEP APNEA (ADULT) (PEDIATRIC): ICD-10-CM

## 2023-08-31 DIAGNOSIS — Z13.1 DIABETES MELLITUS SCREENING: ICD-10-CM

## 2023-08-31 PROBLEM — R53.83 FATIGUE: Status: ACTIVE | Noted: 2023-06-07

## 2023-08-31 PROBLEM — K21.9 GASTROESOPHAGEAL REFLUX DISEASE: Status: ACTIVE | Noted: 2022-01-24

## 2023-08-31 PROBLEM — R06.09 CHRONIC DYSPNEA: Status: ACTIVE | Noted: 2023-06-07

## 2023-08-31 PROBLEM — E55.9 VITAMIN D DEFICIENCY: Status: ACTIVE | Noted: 2023-06-07

## 2023-08-31 PROCEDURE — 99396 PREV VISIT EST AGE 40-64: CPT | Performed by: NURSE PRACTITIONER

## 2023-08-31 PROCEDURE — 3078F DIAST BP <80 MM HG: CPT | Performed by: NURSE PRACTITIONER

## 2023-08-31 PROCEDURE — 3074F SYST BP LT 130 MM HG: CPT | Performed by: NURSE PRACTITIONER

## 2023-08-31 PROCEDURE — 3008F BODY MASS INDEX DOCD: CPT | Performed by: NURSE PRACTITIONER

## 2023-08-31 RX ORDER — PROCHLORPERAZINE 25 MG/1
1 SUPPOSITORY RECTAL CONTINUOUS
Qty: 3 EACH | Refills: 3 | Status: SHIPPED | OUTPATIENT
Start: 2023-08-31 | End: 2023-08-31

## 2023-08-31 RX ORDER — PROCHLORPERAZINE 25 MG/1
1 SUPPOSITORY RECTAL CONTINUOUS
Qty: 9 EACH | Refills: 3 | Status: SHIPPED | OUTPATIENT
Start: 2023-08-31

## (undated) DEVICE — NEEDLE HPO 18GA 1.5IN ECLPS

## (undated) DEVICE — WEBRIL COTTON UNDERCAST PADDING: Brand: WEBRIL

## (undated) DEVICE — SOL  .9 1000ML BTL

## (undated) DEVICE — SUTURE PDS II 2-0 CP

## (undated) DEVICE — PERINEAL POST PAD 1

## (undated) DEVICE — T5 HOOD WITH PEEL AWAY FACE SHIELD

## (undated) DEVICE — SUTURE MONOCRYL 2-0 SH

## (undated) DEVICE — ANTERIOR HIP: Brand: MEDLINE INDUSTRIES, INC.

## (undated) DEVICE — SUTURE VLOC 90 3-0 9\" 2044

## (undated) DEVICE — CHLORAPREP 26ML APPLICATOR

## (undated) DEVICE — GAUZE SPONGES,12 PLY: Brand: CURITY

## (undated) DEVICE — 20 ML SYRINGE LUER-LOCK TIP: Brand: MONOJECT

## (undated) DEVICE — POSITIONER OR KT PT CR

## (undated) DEVICE — 450 ML BOTTLE OF 0.05% CHLORHEXIDINE GLUCONATE IN 99.95% STERILE WATER FOR IRRIGATION, USP AND APPLICATOR.: Brand: IRRISEPT ANTIMICROBIAL WOUND LAVAGE

## (undated) DEVICE — BIPOLAR SEALER 23-301-1 AQM MBS: Brand: AQUAMANTYS™

## (undated) DEVICE — STERILE LATEX POWDER-FREE SURGICAL GLOVESWITH NITRILE COATING: Brand: PROTEXIS

## (undated) DEVICE — BATTERY

## (undated) DEVICE — SOL  .9 1000ML BAG

## (undated) DEVICE — 2T11 #2 PDO 36 X 36: Brand: 2T11 #2 PDO 36 X 36

## (undated) DEVICE — DERMABOND LIQUID ADHESIVE

## (undated) DEVICE — DRESSING SILVERLON ISLAND 11IN

## (undated) DEVICE — BLADE SAW SAGITTAL 19.5

## (undated) DEVICE — PHOTONSABER Y METAL TIP

## (undated) DEVICE — Device: Brand: JELCO

## (undated) NOTE — LETTER
02/09/21        [address]      Dear Brii,    Our records indicate that you have outstanding lab work and or testing that was ordered for you and has not yet been completed:  Orders Placed This Encounter      CMP in 3